# Patient Record
Sex: FEMALE | Race: WHITE | Employment: FULL TIME | URBAN - METROPOLITAN AREA
[De-identification: names, ages, dates, MRNs, and addresses within clinical notes are randomized per-mention and may not be internally consistent; named-entity substitution may affect disease eponyms.]

---

## 2017-04-07 ENCOUNTER — DOCUMENTATION ONLY (OUTPATIENT)
Dept: SURGERY | Age: 57
End: 2017-04-07

## 2017-04-07 ENCOUNTER — OFFICE VISIT (OUTPATIENT)
Dept: SURGERY | Age: 57
End: 2017-04-07

## 2017-04-07 VITALS
HEIGHT: 67 IN | WEIGHT: 247 LBS | BODY MASS INDEX: 38.77 KG/M2 | DIASTOLIC BLOOD PRESSURE: 65 MMHG | HEART RATE: 74 BPM | SYSTOLIC BLOOD PRESSURE: 112 MMHG

## 2017-04-07 DIAGNOSIS — N64.89 SEROMA OF BREAST: Primary | ICD-10-CM

## 2017-04-07 DIAGNOSIS — Z98.890 S/P LUMPECTOMY, LEFT BREAST: ICD-10-CM

## 2017-04-07 DIAGNOSIS — C50.412 BREAST CANCER OF UPPER-OUTER QUADRANT OF LEFT FEMALE BREAST (HCC): ICD-10-CM

## 2017-04-07 RX ORDER — CLINDAMYCIN HYDROCHLORIDE 300 MG/1
CAPSULE ORAL
COMMUNITY
Start: 2017-04-04 | End: 2017-04-27 | Stop reason: ALTCHOICE

## 2017-04-07 RX ORDER — VENLAFAXINE HYDROCHLORIDE 150 MG/1
75 CAPSULE, EXTENDED RELEASE ORAL
COMMUNITY
Start: 2017-02-02 | End: 2017-10-23 | Stop reason: SDUPTHER

## 2017-04-07 NOTE — PATIENT INSTRUCTIONS
Breast Self-Exam: Care Instructions  Your Care Instructions  A breast self-exam is when you check your breasts for lumps or changes. This regular exam helps you learn how your breasts normally look and feel. Most breast problems or changes are not because of cancer. Breast self-exam is not a substitute for a mammogram. Having regular breast exams by your doctor and regular mammograms improve your chances of finding any problems with your breasts. Some women set a time each month to do a step-by-step breast self-exam. Other women like a less formal system. They might look at their breasts as they brush their teeth, or feel their breasts once in a while in the shower. If you notice a change in your breast, tell your doctor. Follow-up care is a key part of your treatment and safety. Be sure to make and go to all appointments, and call your doctor if you are having problems. Its also a good idea to know your test results and keep a list of the medicines you take. How do you do a breast self-exam?  · The best time to examine your breasts is usually one week after your menstrual period begins. Your breasts should not be tender then. If you do not have periods, you might do your exam on a day of the month that is easy to remember. · To examine your breasts:  ¨ Remove all your clothes above the waist and lie down. When you are lying down, your breast tissue spreads evenly over your chest wall, which makes it easier to feel all your breast tissue. ¨ Use the pads--not the fingertips--of the 3 middle fingers of your left hand to check your right breast. Move your fingers slowly in small coin-sized circles that overlap. ¨ Use three levels of pressure to feel of all your breast tissue. Use light pressure to feel the tissue close to the skin surface. Use medium pressure to feel a little deeper. Use firm pressure to feel your tissue close to your breastbone and ribs.  Use each pressure level to feel your breast tissue before moving on to the next spot. ¨ Check your entire breast, moving up and down as if following a strip from the collarbone to the bra line, and from the armpit to the ribs. Repeat until you have covered the entire breast.  ¨ Repeat this procedure for your left breast, using the pads of the 3 middle fingers of your right hand. · To examine your breasts while in the shower:  ¨ Place one arm over your head and lightly soap your breast on that side. ¨ Using the pads of your fingers, gently move your hand over your breast (in the strip pattern described above), feeling carefully for any lumps or changes. ¨ Repeat for the other breast.  · Have your doctor inspect anything you notice to see if you need further testing. Where can you learn more? Go to http://camila-yony.info/. Enter P148 in the search box to learn more about \"Breast Self-Exam: Care Instructions. \"  Current as of: July 26, 2016  Content Version: 11.2  © 6360-8942 [facility]. Care instructions adapted under license by [employer] (which disclaims liability or warranty for this information). If you have questions about a medical condition or this instruction, always ask your healthcare professional. [place] [address] any warranty or liability for your use of this information.

## 2017-04-10 ENCOUNTER — OFFICE VISIT (OUTPATIENT)
Dept: SURGERY | Age: 57
End: 2017-04-10

## 2017-04-10 VITALS
WEIGHT: 247 LBS | HEART RATE: 87 BPM | SYSTOLIC BLOOD PRESSURE: 121 MMHG | DIASTOLIC BLOOD PRESSURE: 56 MMHG | BODY MASS INDEX: 38.77 KG/M2 | HEIGHT: 67 IN

## 2017-04-10 DIAGNOSIS — C50.412 BREAST CANCER OF UPPER-OUTER QUADRANT OF LEFT FEMALE BREAST (HCC): Primary | ICD-10-CM

## 2017-04-10 NOTE — PATIENT INSTRUCTIONS
Seroma: Care Instructions  Your Care Instructions  After a surgery, fluid can collect under the skin near the cut the doctor made (incision). This soft, puffy area is called a seroma. It can be tender to touch. The incision may even have opened up. Some seromas get better on their own. But when there is a lot of fluid under the skin, a seroma is drained to help the area heal.  If your incision has opened up, it may either be packed with gauze or left open to heal. To prevent infection, make sure to keep the area clean and to take all medicines as prescribed. Follow-up care is a key part of your treatment and safety. Be sure to make and go to all appointments, and call your doctor if you are having problems. It's also a good idea to know your test results and keep a list of the medicines you take. How can you care for yourself at home? · Follow your doctor's instructions for seroma care. If you have a drain tube, your doctor will tell you how to take care of it. · Look at the incision every day. Keep the area clean and dry. · Do not bathe unless you can keep the incision dry. Start with sponge baths. Ask your doctor when it is safe to shower. · Do not scrub or rub the incision. And don't wear clothing that rubs it. · Leave any tape strips (such as Steri-Strips) on your incision. They will fall off on their own, or your doctor may tell you when to take them off. · Do not put lotion or powder on incisions. · Keep your incision out of direct sunlight. · Be safe with medicines. Read and follow all instructions on the label. ¨ If the doctor gave you a prescription medicine for pain, take it as prescribed. ¨ If you are not taking a prescription pain medicine, ask the doctor if you can take an over-the-counter medicine. · Your doctor may give you specific instructions on when you can do your normal activities again, such as driving and going back to work. When should you call for help?   Call 911 anytime you think you may need emergency care. For example, call if:  · You passed out (lost consciousness). · You have severe trouble breathing. Call your doctor now or seek immediate medical care if:  · You have symptoms of infection, such as:  ¨ Increased pain, swelling, warmth, or redness. ¨ Red streaks leading from the incision. ¨ Pus draining from the incision or a yellow or green discharge that is increasing. ¨ A fever. · You bleed through a bandage. · The incision opens up. Watch closely for changes in your health, and be sure to contact your doctor if:  · The incision is not healing as expected. Where can you learn more? Go to http://camila-yony.info/. Enter S817 in the search box to learn more about \"Seroma: Care Instructions. \"  Current as of: May 27, 2016  Content Version: 11.2  © 1496-4581 [employer]. Care instructions adapted under license by [employer] (which disclaims liability or warranty for this information). If you have questions about a medical condition or this instruction, always ask your healthcare professional. [address] any warranty or liability for your use of this information.

## 2017-04-10 NOTE — PROGRESS NOTES
HISTORY OF PRESENT ILLNESS  Fidencio Caro is a 60 y.o. female. HPI   ESTABLISHED patient here for follow-up of infected seroma. Was seen on Friday by Dr. Bianka Singleton, who aspirated 6cc's of purulent fluid. This was sent for culture, and there is no growth so far. Patient continues on her Clindamycin. Pain in the breast is somewhat better. Is not sure about the redness. Did have a little drainage on a dressing post-aspiration and also on her bed sheets the following day. Has had none since then. Has had no fever. Has not taken anything for pain over the last couple of days. Did take Aleve early on last week. History of breast cancer 2015-  LEFT breast IDC - ER positive, [place] positive, Her2 negative, LN negative. 1/5/16- LEFT breast lumpectomy, SLNB - T2N0  Mammaprint low risk luminal A. No chemotherapy. 4/5/16- completed radiation. Followed by Dr. Paige Brody. 5/3/16- started Arimidex. Followed by Dr. Bee Gonzalez.     [facility]    Physical Exam    ASSESSMENT and PLAN  {ASSESSMENT/PLAN:17752}

## 2017-04-10 NOTE — PROGRESS NOTES
HISTORY OF PRESENT ILLNESS  Micki Hunt is a 60 y.o. female. HPI  ESTABLISHED patient here for follow-up of infected seroma. Was seen on Friday by Dr. Jakob Dimas, who aspirated 6cc's of purulent fluid. This was sent for culture, and there is no growth so far. Patient continues on her Clindamycin. Pain in the breast is somewhat better. Is not sure about the redness. Did have a little drainage on a dressing post-aspiration and also on her bed sheets the following day. Has had none since then. Has had no fever. Has not taken anything for pain over the last couple of days. Did take Aleve early on last week.      History of breast cancer 2015-  LEFT breast IDC - ER positive, [place] positive, Her2 negative, LN negative. 1/5/16- LEFT breast lumpectomy, SLNB - T2N0  Mammaprint low risk luminal A. No chemotherapy. 4/5/16- completed radiation. Followed by Dr. Kanchan Tolbert. 5/3/16- started Arimidex. Followed by Dr. Lorette Bloch. Past Medical History:   Diagnosis Date    Breast cancer ([facility] [address] 12/05/2015    IDC, DCIS    Cancer ([facility] [address]     LEFT BREAST    Fibrocystic breast disease     Psychiatric disorder 12/29/15    DEPRESSION    PUD (peptic ulcer disease)     OCCASIONAL DISCOMFORT-OCC TAKES ZANTAC    Radiation therapy complication 1711    Sleep apnea     USES CPAP       Past Surgical History:   Procedure Laterality Date    BREAST SURGERY PROCEDURE UNLISTED  1981    BREAST BIOPSY-CYST-LEFT    HX APPENDECTOMY  1973    HX BREAST LUMPECTOMY Left 1/5/2016    LEFT BREAST LUMPECTOMY WITH SENTINEL NODE BIOPSY WITH ULTRASOUND performed by [address], MD at [address] HX COLONOSCOPY  X2    LATEST-2015    HX WISDOM TEETH EXTRACTION  1983       Social History     Social History    Marital status:      Spouse name: N/A    Number of children: N/A    Years of education: N/A     Occupational History    Not on file.      Social History Main Topics    Smoking status: Former Smoker     Packs/day: 0.25     Years: 30.00     Quit date: 12/29/2014    Smokeless tobacco: Never Used      Comment: HAS QUIT NUMEROUS TIMES    Alcohol use 2.4 oz/week     2 Glasses of wine, 2 Standard drinks or equivalent per week    Drug use: No      Comment: BEFORE AGE 16, USED HASH      Sexual activity: Not on file     Other Topics Concern    Not on file     Social History Narrative       Current Outpatient Prescriptions on File Prior to Visit   Medication Sig Dispense Refill    clindamycin (CLEOCIN) 300 mg capsule       venlafaxine-SR (EFFEXOR-XR) 150 mg capsule       DOCUSATE CALCIUM (STOOL SOFTENER PO) Take  by mouth.  cyanocobalamin (VITAMIN B-12) 500 mcg tablet Take 500 mcg by mouth daily.  anastrozole (ARIMIDEX) 1 mg tablet Take 1 Tab by mouth daily. Indications: HORMONE RECEPTOR POSITIVE BREAST CANCER 90 Tab 3    aspirin delayed-release 81 mg tablet Take  by mouth daily.  cholecalciferol (VITAMIN D3) 1,000 unit tablet Take 2,000 Units by mouth daily.  CALCIUM CARBONATE (CALCIUM 600 PO) Take 1,200 mg by mouth daily.  multivitamin (ONE A DAY) tablet Take 1 Tab by mouth daily.  VITAMIN E ACETATE (VITAMIN E PO) Take 1,000 Units by mouth daily.  ASCORBATE CALCIUM (VITAMIN C PO) Take 1,000 mg by mouth daily. No current facility-administered medications on file prior to visit. Allergies   Allergen Reactions    Pcn [Penicillins] Other (comments)     \"convulsions\"    Sulfa (Sulfonamide Antibiotics) Hives       OB History     Obstetric Comments    Menarche:  13. LMP: 50.  # of Children:  0. Age at Delivery of First Child:  n/a. Hysterectomy/oophorectomy:  NO/NO. Breast Bx:  yes. Hx of Breast Feeding:  no. BCP:  yes. Hormone therapy:  ? .                 ROS  Constitutional: Negative    HENT: Negative. Eyes: Negative. Respiratory: Negative. Cardiovascular: Negative. Gastrointestinal: Negative. Genitourinary: Negative. Musculoskeletal: Negative. Skin: Negative. Neurological: Negative. Endo/Heme/Allergies: Negative. Psychiatric/Behavioral: Negative. Physical Exam   Cardiovascular: Normal rate and normal heart sounds. Pulmonary/Chest: Breath sounds normal. Right breast exhibits no inverted nipple, no mass, no nipple discharge, no skin change and no tenderness. Left breast exhibits tenderness (around incision site). Left breast exhibits no inverted nipple, no mass, no nipple discharge and no skin change. Breasts are symmetrical.       Lymphadenopathy:        Right cervical: No superficial cervical, no deep cervical and no posterior cervical adenopathy present. Left cervical: No superficial cervical, no deep cervical and no posterior cervical adenopathy present. Right axillary: No pectoral and no lateral adenopathy present. Left axillary: No pectoral and no lateral adenopathy present. BREAST ULTRASOUND  Indication: Left  breast mass 3:00  Technique: The area was scanned using a high-frequency linear-array near-field transducer  Findings: minimal fluid cavity  Impression: Mild residual seroma   Disposition: Observation and antibiotics    ASSESSMENT and PLAN    ICD-10-CM ICD-9-CM    1. Breast cancer of upper-outer quadrant of left female breast ([facility] [address] C50.412 174.4      Dr. Stone Fat pt here to f/u on infected seroma cavity in LT breast and doing well. Advised to observe and continue to treat with clindamycin as she continues to improve with abx after aspiration last week. F/u with Dr. Freddi Galeazzi in 1 week or sooner prn. This plan was reviewed with the patient and patient agrees. All questions were answered.     Written by Hayden Ceja, as dictated by Dr. Jennie Hendrickson MD.

## 2017-04-11 LAB — BACTERIA SPEC ANAEROBE CULT: NORMAL

## 2017-04-11 NOTE — COMMUNICATION BODY
HISTORY OF PRESENT ILLNESS  Micki Hunt is a 60 y.o. female. HPI  ESTABLISHED patient here for follow-up of infected seroma. Was seen on Friday by Dr. Jakob Dimas, who aspirated 6cc's of purulent fluid. This was sent for culture, and there is no growth so far. Patient continues on her Clindamycin. Pain in the breast is somewhat better. Is not sure about the redness. Did have a little drainage on a dressing post-aspiration and also on her bed sheets the following day. Has had none since then. Has had no fever. Has not taken anything for pain over the last couple of days. Did take Aleve early on last week.      History of breast cancer 2015-  LEFT breast IDC - ER positive, [place] positive, Her2 negative, LN negative. 1/5/16- LEFT breast lumpectomy, SLNB - T2N0  Mammaprint low risk luminal A. No chemotherapy. 4/5/16- completed radiation. Followed by Dr. Kanchan Tolbert. 5/3/16- started Arimidex. Followed by Dr. Lorette Bloch. Past Medical History:   Diagnosis Date    Breast cancer ([facility] [address] 12/05/2015    IDC, DCIS    Cancer ([facility] [address]     LEFT BREAST    Fibrocystic breast disease     Psychiatric disorder 12/29/15    DEPRESSION    PUD (peptic ulcer disease)     OCCASIONAL DISCOMFORT-OCC TAKES ZANTAC    Radiation therapy complication 0575    Sleep apnea     USES CPAP       Past Surgical History:   Procedure Laterality Date    BREAST SURGERY PROCEDURE UNLISTED  1981    BREAST BIOPSY-CYST-LEFT    HX APPENDECTOMY  1973    HX BREAST LUMPECTOMY Left 1/5/2016    LEFT BREAST LUMPECTOMY WITH SENTINEL NODE BIOPSY WITH ULTRASOUND performed by Chicho Seo MD at [address] HX COLONOSCOPY  X2    LATEST-2015    HX WISDOM TEETH EXTRACTION  1983       Social History     Social History    Marital status:      Spouse name: N/A    Number of children: N/A    Years of education: N/A     Occupational History    Not on file.      Social History Main Topics    Smoking status: Former Smoker     Packs/day: 0.25     Years: 30.00     Quit date: 12/29/2014    Smokeless tobacco: Never Used      Comment: HAS QUIT NUMEROUS TIMES    Alcohol use 2.4 oz/week     2 Glasses of wine, 2 Standard drinks or equivalent per week    Drug use: No      Comment: BEFORE AGE 16, USED HASH      Sexual activity: Not on file     Other Topics Concern    Not on file     Social History Narrative       Current Outpatient Prescriptions on File Prior to Visit   Medication Sig Dispense Refill    clindamycin (CLEOCIN) 300 mg capsule       venlafaxine-SR (EFFEXOR-XR) 150 mg capsule       DOCUSATE CALCIUM (STOOL SOFTENER PO) Take  by mouth.  cyanocobalamin (VITAMIN B-12) 500 mcg tablet Take 500 mcg by mouth daily.  anastrozole (ARIMIDEX) 1 mg tablet Take 1 Tab by mouth daily. Indications: HORMONE RECEPTOR POSITIVE BREAST CANCER 90 Tab 3    aspirin delayed-release 81 mg tablet Take  by mouth daily.  cholecalciferol (VITAMIN D3) 1,000 unit tablet Take 2,000 Units by mouth daily.  CALCIUM CARBONATE (CALCIUM 600 PO) Take 1,200 mg by mouth daily.  multivitamin (ONE A DAY) tablet Take 1 Tab by mouth daily.  VITAMIN E ACETATE (VITAMIN E PO) Take 1,000 Units by mouth daily.  ASCORBATE CALCIUM (VITAMIN C PO) Take 1,000 mg by mouth daily. No current facility-administered medications on file prior to visit. Allergies   Allergen Reactions    Pcn [Penicillins] Other (comments)     \"convulsions\"    Sulfa (Sulfonamide Antibiotics) Hives       OB History     Obstetric Comments    Menarche:  13. LMP: 50.  # of Children:  0. Age at Delivery of First Child:  n/a. Hysterectomy/oophorectomy:  NO/NO. Breast Bx:  yes. Hx of Breast Feeding:  no. BCP:  yes. Hormone therapy:  ? .                 ROS  Constitutional: Negative    HENT: Negative. Eyes: Negative. Respiratory: Negative. Cardiovascular: Negative. Gastrointestinal: Negative. Genitourinary: Negative. Musculoskeletal: Negative. Skin: Negative. Neurological: Negative. Endo/Heme/Allergies: Negative. Psychiatric/Behavioral: Negative. Physical Exam   Cardiovascular: Normal rate and normal heart sounds. Pulmonary/Chest: Breath sounds normal. Right breast exhibits no inverted nipple, no mass, no nipple discharge, no skin change and no tenderness. Left breast exhibits tenderness (around incision site). Left breast exhibits no inverted nipple, no mass, no nipple discharge and no skin change. Breasts are symmetrical.       Lymphadenopathy:        Right cervical: No superficial cervical, no deep cervical and no posterior cervical adenopathy present. Left cervical: No superficial cervical, no deep cervical and no posterior cervical adenopathy present. Right axillary: No pectoral and no lateral adenopathy present. Left axillary: No pectoral and no lateral adenopathy present. BREAST ULTRASOUND  Indication: Left  breast mass 3:00  Technique: The area was scanned using a high-frequency linear-array near-field transducer  Findings: minimal fluid cavity  Impression: Mild residual seroma   Disposition: Observation and antibiotics    ASSESSMENT and PLAN    ICD-10-CM ICD-9-CM    1. [facility] upper-outer quadrant [facility] C50.412 174.4      Dr. Martine Pathak pt here to f/u on infected seroma cavity in LT breast and doing well. Advised to observe and continue to treat with clindamycin as she continues to improve with abx after aspiration last week. F/u with Dr. Nellie Govea in 1 week or sooner prn. This plan was reviewed with the patient and patient agrees. All questions were answered.     Written by Seth Mcelod, as dictated by Dr. Nancy Hernandez MD.

## 2017-04-17 ENCOUNTER — OFFICE VISIT (OUTPATIENT)
Dept: SURGERY | Age: 57
End: 2017-04-17

## 2017-04-17 VITALS
DIASTOLIC BLOOD PRESSURE: 52 MMHG | HEART RATE: 87 BPM | SYSTOLIC BLOOD PRESSURE: 109 MMHG | HEIGHT: 67 IN | WEIGHT: 247 LBS | BODY MASS INDEX: 38.77 KG/M2

## 2017-04-17 DIAGNOSIS — C50.412 BREAST CANCER OF UPPER-OUTER QUADRANT OF LEFT FEMALE BREAST (HCC): Primary | ICD-10-CM

## 2017-04-17 DIAGNOSIS — Z98.890 S/P LUMPECTOMY, LEFT BREAST: ICD-10-CM

## 2017-04-17 NOTE — PATIENT INSTRUCTIONS
Breast Cancer: Care Instructions  Your Care Instructions  Breast cancer occurs when abnormal cells grow out of control in the breast. These cancer cells can spread within the breast, to nearby lymph nodes and other tissues, and to other parts of the body. Being treated for cancer can weaken your body, and you may feel very tired. Get the rest your body needs so you can feel better. Finding out that you have cancer is scary. You may feel many emotions and may need some help coping. Seek out family, friends, and counselors for support. You also can do things at home to make yourself feel better while you go through treatment. Call the [employer] (4-606.962.8626) or visit its website at [employer] for more information. Follow-up care is a key part of your treatment and safety. Be sure to make and go to all appointments, and call your doctor if you are having problems. It's also a good idea to know your test results and keep a list of the medicines you take. How can you care for yourself at home? · Take your medicines exactly as prescribed. Call your doctor if you think you are having a problem with your medicine. You may get medicine for nausea and vomiting if you have these side effects. · Follow your doctor's instructions to relieve pain. Pain from cancer and surgery can almost always be controlled. Use pain medicine when you first notice pain, before it becomes severe. · Eat healthy food. If you do not feel like eating, try to eat food that has protein and extra calories to keep up your strength and prevent weight loss. Drink liquid meal replacements for extra calories and protein. Try to eat your main meal early. · Get some physical activity every day, but do not get too tired. Keep doing the hobbies you enjoy as your energy allows. · Do not smoke. Smoking can make your cancer worse. If you need help quitting, talk to your doctor about stop-smoking programs and medicines.  These can increase your chances of quitting for good. · Take steps to control your stress and workload. Learn relaxation techniques. ¨ Share your feelings. Stress and tension affect our emotions. By expressing your feelings to others, you may be able to understand and cope with them. ¨ Consider joining a support group. Talking about a problem with your spouse, a good friend, or other people with similar problems is a good way to reduce tension and stress. ¨ Express yourself through art. Try writing, crafts, dance, or art to relieve stress. Some dance, writing, or art groups may be available just for people who have cancer. ¨ Be kind to your body and mind. Getting enough sleep, eating a healthy diet, and taking time to do things you enjoy can contribute to an overall feeling of balance in your life and can help reduce stress. ¨ Get help if you need it. Discuss your concerns with your doctor or counselor. · If you are vomiting or have diarrhea:  ¨ Drink plenty of fluids (enough so that your urine is light yellow or clear like water) to prevent dehydration. Choose water and other caffeine-free clear liquids. If you have kidney, heart, or liver disease and have to limit fluids, talk with your doctor before you increase the amount of fluids you drink. ¨ When you are able to eat, try clear soups, mild foods, and liquids until all symptoms are gone for 12 to 48 hours. Other good choices include dry toast, crackers, cooked cereal, and gelatin dessert, such as Jell-O.  · If you have not already done so, prepare a list of advance directives. Advance directives are instructions to your doctor and family members about what kind of care you want if you become unable to speak or express yourself. When should you call for help? Call your doctor now or seek immediate medical care if:  · You have a fever. · Any part of your breast becomes red, tender, swollen, or hot.   · You have pain, redness, or swelling in the arm on the same side as your breast cancer. Watch closely for changes in your health, and be sure to contact your doctor if:  · You have pain that is not controlled by medicine. · You have nausea or vomiting. · You are constipated or have diarrhea. Where can you learn more? Go to http://camila-yony.info/. Enter V321 in the search box to learn more about \"Breast Cancer: Care Instructions. \"  Current as of: July 26, 2016  Content Version: 11.2  © 7032-4804 [employer]. Care instructions adapted under license by [employer] (which disclaims liability or warranty for this information). If you have questions about a medical condition or this instruction, always ask your healthcare professional. [address] any warranty or liability for your use of this information. Breast Cancer: Care Instructions  Your Care Instructions  Breast cancer occurs when abnormal cells grow out of control in the breast. These cancer cells can spread within the breast, to nearby lymph nodes and other tissues, and to other parts of the body. Being treated for cancer can weaken your body, and you may feel very tired. Get the rest your body needs so you can feel better. Finding out that you have cancer is scary. You may feel many emotions and may need some help coping. Seek out family, friends, and counselors for support. You also can do things at home to make yourself feel better while you go through treatment. Call the [facility] (9-110.431.6203) or visit its website at [address] [employer]. [employer] for more information. Follow-up care is a key part of your treatment and safety. Be sure to make and go to all appointments, and call your doctor if you are having problems. It's also a good idea to know your test results and keep a list of the medicines you take. How can you care for yourself at home? · Take your medicines exactly as prescribed.  Call your doctor if you think you are having a problem with your medicine. You may get medicine for nausea and vomiting if you have these side effects. · Follow your doctor's instructions to relieve pain. Pain from cancer and surgery can almost always be controlled. Use pain medicine when you first notice pain, before it becomes severe. · Eat healthy food. If you do not feel like eating, try to eat food that has protein and extra calories to keep up your strength and prevent weight loss. Drink liquid meal replacements for extra calories and protein. Try to eat your main meal early. · Get some physical activity every day, but do not get too tired. Keep doing the hobbies you enjoy as your energy allows. · Do not smoke. Smoking can make your cancer worse. If you need help quitting, talk to your doctor about stop-smoking programs and medicines. These can increase your chances of quitting for good. · Take steps to control your stress and workload. Learn relaxation techniques. ¨ Share your feelings. Stress and tension affect our emotions. By expressing your feelings to others, you may be able to understand and cope with them. ¨ Consider joining a support group. Talking about a problem with your spouse, a good friend, or other people with similar problems is a good way to reduce tension and stress. ¨ Express yourself through art. Try writing, crafts, dance, or art to relieve stress. Some dance, writing, or art groups may be available just for people who have cancer. ¨ Be kind to your body and mind. Getting enough sleep, eating a healthy diet, and taking time to do things you enjoy can contribute to an overall feeling of balance in your life and can help reduce stress. ¨ Get help if you need it. Discuss your concerns with your doctor or counselor. · If you are vomiting or have diarrhea:  ¨ Drink plenty of fluids (enough so that your urine is light yellow or clear like water) to prevent dehydration. Choose water and other caffeine-free clear liquids. If you have kidney, heart, or liver disease and have to limit fluids, talk with your doctor before you increase the amount of fluids you drink. ¨ When you are able to eat, try clear soups, mild foods, and liquids until all symptoms are gone for 12 to 48 hours. Other good choices include dry toast, crackers, cooked cereal, and gelatin dessert, such as Jell-O.  · If you have not already done so, prepare a list of advance directives. Advance directives are instructions to your doctor and family members about what kind of care you want if you become unable to speak or express yourself. When should you call for help? Call your doctor now or seek immediate medical care if:  · You have a fever. · Any part of your breast becomes red, tender, swollen, or hot. · You have pain, redness, or swelling in the arm on the same side as your breast cancer. Watch closely for changes in your health, and be sure to contact your doctor if:  · You have pain that is not controlled by medicine. · You have nausea or vomiting. · You are constipated or have diarrhea. Where can you learn more? Go to http://camila-yony.info/. Enter V321 in the search box to learn more about \"Breast Cancer: Care Instructions. \"  Current as of: July 26, 2016  Content Version: 11.2  © 4581-3680 [employer]. Care instructions adapted under license by S[employer] (which disclaims liability or warranty for this information). If you have questions about a medical condition or this instruction, always ask your healthcare professional. [place] [address] any warranty or liability for your use of this information.

## 2017-04-17 NOTE — MR AVS SNAPSHOT
Visit Information Date & Time Provider Department Dept. Phone Encounter #  
 4/17/2017  9:30  Michaela Russell MD [address] [facility] 51 [phone] Upcoming Health Maintenance Date Due Hepatitis C Screening 1960 Pneumococcal 19-64 Highest Risk (1 of 3 - PCV13) 9/12/1977 DTaP/Tdap/Td series (1 - Tdap) 9/12/1979 PAP AKA CERVICAL CYTOLOGY 9/12/1979 FOBT Q 1 YEAR AGE 50-75 9/12/2008 INFLUENZA AGE 9 TO ADULT 8/1/2016 BREAST CANCER SCRN MAMMOGRAM 10/3/2018 Allergies as of 4/17/2017  Review Complete On: 4/17/2017 By: Jovon Torres RN Severity Noted Reaction Type Reactions Pcn [Penicillins] High 12/14/2015    Other (comments) \"convulsions\" Sulfa (Sulfonamide Antibiotics)  12/14/2015    Hives Current Immunizations  Reviewed on 9/13/2016 No immunizations on file. Not reviewed this visit Vitals BP Pulse Height(growth percentile) Weight(growth percentile) BMI OB Status 109/52 87 5' 7\" (1.702 m) 247 lb (112 kg) 38.69 kg/m2 Postmenopausal  
 Smoking Status Former Smoker BMI and BSA Data Body Mass Index Body Surface Area  
 38.69 kg/m 2 2.3 m 2 Preferred Pharmacy Pharmacy Name Phone [facility] [address] [place] [address] [phone] Your Updated Medication List  
  
   
This list is accurate as of: 4/17/17 10:09 AM.  Always use your most recent med list.  
  
  
  
  
 anastrozole 1 mg tablet Commonly known as:  ARIMIDEX Take 1 Tab by mouth daily. Indications: HORMONE RECEPTOR POSITIVE BREAST CANCER  
  
 aspirin delayed-release 81 mg tablet Take  by mouth daily. CALCIUM 600 PO Take 1,200 mg by mouth daily. cholecalciferol 1,000 unit tablet Commonly known as:  VITAMIN D3 Take 2,000 Units by mouth daily. clindamycin 300 mg capsule Commonly known as:  CLEOCIN  
  
 multivitamin tablet Commonly known as:  ONE A DAY Take 1 Tab by mouth daily. STOOL SOFTENER PO Take  by mouth. venlafaxine- mg capsule Commonly known as:  EFFEXOR-XR  
  
 VITAMIN B-12 500 mcg tablet Generic drug:  cyanocobalamin Take 500 mcg by mouth daily. VITAMIN C PO Take 1,000 mg by mouth daily. VITAMIN E PO Take 1,000 Units by mouth daily. Patient Instructions Breast Cancer: Care Instructions Your Care Instructions Breast cancer occurs when abnormal cells grow out of control in the breast. These cancer cells can spread within the breast, to nearby lymph nodes and other tissues, and to other parts of the body. Being treated for cancer can weaken your body, and you may feel very tired. Get the rest your body needs so you can feel better. Finding out that you have cancer is scary. You may feel many emotions and may need some help coping. Seek out family, friends, and counselors for support. You also can do things at home to make yourself feel better while you go through treatment. Call the [employer] (0-577.668.9135) or visit its website at [address] [employer] for more information. Follow-up care is a key part of your treatment and safety. Be sure to make and go to all appointments, and call your doctor if you are having problems. It's also a good idea to know your test results and keep a list of the medicines you take. How can you care for yourself at home? · Take your medicines exactly as prescribed. Call your doctor if you think you are having a problem with your medicine. You may get medicine for nausea and vomiting if you have these side effects. · Follow your doctor's instructions to relieve pain. Pain from cancer and surgery can almost always be controlled. Use pain medicine when you first notice pain, before it becomes severe. · Eat healthy food.  If you do not feel like eating, try to eat food that has protein and extra calories to keep up your strength and prevent weight loss. Drink liquid meal replacements for extra calories and protein. Try to eat your main meal early. · Get some physical activity every day, but do not get too tired. Keep doing the hobbies you enjoy as your energy allows. · Do not smoke. Smoking can make your cancer worse. If you need help quitting, talk to your doctor about stop-smoking programs and medicines. These can increase your chances of quitting for good. · Take steps to control your stress and workload. Learn relaxation techniques. ¨ Share your feelings. Stress and tension affect our emotions. By expressing your feelings to others, you may be able to understand and cope with them. ¨ Consider joining a support group. Talking about a problem with your spouse, a good friend, or other people with similar problems is a good way to reduce tension and stress. ¨ Express yourself through art. Try writing, crafts, dance, or art to relieve stress. Some dance, writing, or art groups may be available just for people who have cancer. ¨ Be kind to your body and mind. Getting enough sleep, eating a healthy diet, and taking time to do things you enjoy can contribute to an overall feeling of balance in your life and can help reduce stress. ¨ Get help if you need it. Discuss your concerns with your doctor or counselor. · If you are vomiting or have diarrhea: ¨ Drink plenty of fluids (enough so that your urine is light yellow or clear like water) to prevent dehydration. Choose water and other caffeine-free clear liquids. If you have kidney, heart, or liver disease and have to limit fluids, talk with your doctor before you increase the amount of fluids you drink. ¨ When you are able to eat, try clear soups, mild foods, and liquids until all symptoms are gone for 12 to 48 hours. Other good choices include dry toast, crackers, cooked cereal, and gelatin dessert, such as Jell-O. · If you have not already done so, prepare a list of advance directives. Advance directives are instructions to your doctor and family members about what kind of care you want if you become unable to speak or express yourself. When should you call for help? Call your doctor now or seek immediate medical care if: 
· You have a fever. · Any part of your breast becomes red, tender, swollen, or hot. · You have pain, redness, or swelling in the arm on the same side as your breast cancer. Watch closely for changes in your health, and be sure to contact your doctor if: 
· You have pain that is not controlled by medicine. · You have nausea or vomiting. · You are constipated or have diarrhea. Where can you learn more? Go to http://camila-yony.info/. Enter V321 in the search box to learn more about \"Breast Cancer: Care Instructions. \" Current as of: July 26, 2016 Content Version: 11.2 © 9042-0009 [employer]. Care instructions adapted under license by [employer]tdWorld (which disclaims liability or warranty for this information). If you have questions about a medical condition or this instruction, always ask your healthcare professional. [address] any warranty or liability for your use of this information. Breast Cancer: Care Instructions Your Care Instructions Breast cancer occurs when abnormal cells grow out of control in the breast. These cancer cells can spread within the breast, to nearby lymph nodes and other tissues, and to other parts of the body. Being treated for cancer can weaken your body, and you may feel very tired. Get the rest your body needs so you can feel better. Finding out that you have cancer is scary. You may feel many emotions and may need some help coping. Seek out family, friends, and counselors for support.  You also can do things at home to make yourself feel better while you go through treatment. Call the [facility] (0-160.221.3093) or visit its website at [address]. [employer] for more information. Follow-up care is a key part of your treatment and safety. Be sure to make and go to all appointments, and call your doctor if you are having problems. It's also a good idea to know your test results and keep a list of the medicines you take. How can you care for yourself at home? · Take your medicines exactly as prescribed. Call your doctor if you think you are having a problem with your medicine. You may get medicine for nausea and vomiting if you have these side effects. · Follow your doctor's instructions to relieve pain. Pain from cancer and surgery can almost always be controlled. Use pain medicine when you first notice pain, before it becomes severe. · Eat healthy food. If you do not feel like eating, try to eat food that has protein and extra calories to keep up your strength and prevent weight loss. Drink liquid meal replacements for extra calories and protein. Try to eat your main meal early. · Get some physical activity every day, but do not get too tired. Keep doing the hobbies you enjoy as your energy allows. · Do not smoke. Smoking can make your cancer worse. If you need help quitting, talk to your doctor about stop-smoking programs and medicines. These can increase your chances of quitting for good. · Take steps to control your stress and workload. Learn relaxation techniques. ¨ Share your feelings. Stress and tension affect our emotions. By expressing your feelings to others, you may be able to understand and cope with them. ¨ Consider joining a support group. Talking about a problem with your spouse, a good friend, or other people with similar problems is a good way to reduce tension and stress. ¨ Express yourself through art. Try writing, crafts, dance, or art to relieve stress.  Some dance, writing, or art groups may be available just for people who have cancer. ¨ Be kind to your body and mind. Getting enough sleep, eating a healthy diet, and taking time to do things you enjoy can contribute to an overall feeling of balance in your life and can help reduce stress. ¨ Get help if you need it. Discuss your concerns with your doctor or counselor. · If you are vomiting or have diarrhea: ¨ Drink plenty of fluids (enough so that your urine is light yellow or clear like water) to prevent dehydration. Choose water and other caffeine-free clear liquids. If you have kidney, heart, or liver disease and have to limit fluids, talk with your doctor before you increase the amount of fluids you drink. ¨ When you are able to eat, try clear soups, mild foods, and liquids until all symptoms are gone for 12 to 48 hours. Other good choices include dry toast, crackers, cooked cereal, and gelatin dessert, such as Jell-O. · If you have not already done so, prepare a list of advance directives. Advance directives are instructions to your doctor and family members about what kind of care you want if you become unable to speak or express yourself. When should you call for help? Call your doctor now or seek immediate medical care if: 
· You have a fever. · Any part of your breast becomes red, tender, swollen, or hot. · You have pain, redness, or swelling in the arm on the same side as your breast cancer. Watch closely for changes in your health, and be sure to contact your doctor if: 
· You have pain that is not controlled by medicine. · You have nausea or vomiting. · You are constipated or have diarrhea. Where can you learn more? Go to http://camila-yony.info/. Enter V321 in the search box to learn more about \"Breast Cancer: Care Instructions. \" Current as of: July 26, 2016 Content Version: 11.2 © 3858-0400 [employer].  Care instructions adapted under license by Subtext (which disclaims liability or warranty for this information). If you have questions about a medical condition or this instruction, always ask your healthcare professional. [address] any warranty or liability for your use of this information. Introducing [facility] [place] [facility]! Dear Koby Davis: Thank you for requesting a [email or web address] account. Our records indicate that you have previously registered for a [email or web address] account but its currently inactive. Please call our Footfall123 support line at [phone]. Additional Information If you have questions, please visit the Frequently Asked Questions section of the [email or web address] website at [email or web address]. "[email or web address]/[email or web address]/. Remember, Footfall123 is NOT to be used for urgent needs. For medical emergencies, dial 911. Now available from your iPhone and Android! Please provide this summary of care documentation to your next provider. Your primary care clinician is listed as [address]. If you have any questions after today's visit, please call [phone].

## 2017-04-17 NOTE — PROGRESS NOTES
HISTORY OF PRESENT ILLNESS  Elise Kent is a 60 y.o. female. HPI ESTABLISHED patient here for LEFT breast wound check. The area is healing. She no longer has any pain. Complains of itching at the sight. Incision is dry and intact. No swelling or redness. The incision feels hard. She has completed clindamycin. Last visit with Dr. Ginna Pulido 0/99/66. Prior aspiration to that visit by Dr. Marisa Sparks    1/5/16- LEFT breast lumpectomy, SLNB - T2N0  01/05/16: LT lumpectomy with SNBx for 2.4 cm, Grade I invasive ductal carcinoma and nuclear grade II DCIS. 0/1 LN involved. ER+/[place]+ Her2-. Low risk mammaprint. Clear margins. pT2 pN0 Mx.     03/03/16-04/05/16: s/p XRT      04/15/16: started Arimidex with Dr. Gonzalez Carry  Review of Systems   All other systems reviewed and are negative. Physical Exam   Pulmonary/Chest:       Erythema resolved. No fluctuance or induration. Nursing note and vitals reviewed. ASSESSMENT and PLAN    ICD-10-CM ICD-9-CM    1. Breast cancer of upper-outer quadrant of left female breast (HCC) C50.412 174.4    2. S/P lumpectomy, left breast Z90.12 V45.89      -observe for now. She has completed abx. She was instructed to contact our office with any new symptoms of erythema, fever, pain or swelling.

## 2017-04-27 ENCOUNTER — OFFICE VISIT (OUTPATIENT)
Dept: ONCOLOGY | Age: 57
End: 2017-04-27

## 2017-04-27 VITALS
HEART RATE: 90 BPM | SYSTOLIC BLOOD PRESSURE: 122 MMHG | OXYGEN SATURATION: 97 % | TEMPERATURE: 99 F | HEIGHT: 67 IN | RESPIRATION RATE: 16 BRPM | DIASTOLIC BLOOD PRESSURE: 80 MMHG | WEIGHT: 240 LBS | BODY MASS INDEX: 37.67 KG/M2

## 2017-04-27 DIAGNOSIS — Z79.811 AROMATASE INHIBITOR USE: ICD-10-CM

## 2017-04-27 DIAGNOSIS — C50.412 BREAST CANCER OF UPPER-OUTER QUADRANT OF LEFT FEMALE BREAST (HCC): Primary | ICD-10-CM

## 2017-04-27 DIAGNOSIS — Z98.890 S/P LUMPECTOMY, LEFT BREAST: ICD-10-CM

## 2017-04-27 DIAGNOSIS — F32.A DEPRESSION, UNSPECIFIED DEPRESSION TYPE: ICD-10-CM

## 2017-04-27 DIAGNOSIS — G47.30 SLEEP APNEA, UNSPECIFIED TYPE: ICD-10-CM

## 2017-04-27 RX ORDER — ANASTROZOLE 1 MG/1
1 TABLET ORAL DAILY
Qty: 90 TAB | Refills: 3 | Status: SHIPPED | OUTPATIENT
Start: 2017-04-27 | End: 2017-10-23 | Stop reason: SDUPTHER

## 2017-04-27 RX ORDER — DIPHENHYDRAMINE HCL 25 MG
25 CAPSULE ORAL
COMMUNITY
End: 2018-05-01

## 2017-04-27 NOTE — PROGRESS NOTES
Ladell Merlin is a 60 y.o. female here today for breast cancer f/u.  Patient states pain 2/10 in left breast; patient describes pain as a tingling/sharp

## 2017-04-27 NOTE — PROGRESS NOTES
HEME/ONC CONSULT       Adrian Flores is a 60 y.o. 1960 female and presents with Follow-up and Breast Cancer    CC breast cancer 12/15    HPI  Pt seen today for office 6 mo fu breast cancer on adjuvant hormonal therapy  C/o left breast pain/ low grade fever. Went to PCP and put on abx. Saw breast surgery and had breast drained. Pt here today stating that left breast is red/ pink again. Pt saw psychiatry and is on effexor and doing better overall.   mammo 10/16      DX   Encounter Diagnoses   Name Primary?     Breast cancer of upper-outer quadrant of left female breast ([facility] [address] Yes    S/P lumpectomy, left breast     Aromatase inhibitor use     Sleep apnea, unspecified type     Depression, unspecified depression type         STAGE: T2N0 ER+ HER2 neg mammoprint low risk    TREATMENT COURSE:     Lumpectomy for radiation and then AI      Past Medical History:   Diagnosis Date    Breast cancer ([facility] [address] 12/05/2015    IDC, DCIS    Cancer ([facility] [address]     LEFT BREAST    Fibrocystic breast disease     Psychiatric disorder 12/29/15    DEPRESSION    PUD (peptic ulcer disease)     OCCASIONAL DISCOMFORT-OCC TAKES ZANTAC    Radiation therapy complication 4737    Sleep apnea     USES CPAP     Past Surgical History:   Procedure Laterality Date    BREAST SURGERY PROCEDURE UNLISTED  1981    BREAST BIOPSY-CYST-LEFT    HX APPENDECTOMY  1973    HX BREAST LUMPECTOMY Left 1/5/2016    LEFT BREAST LUMPECTOMY WITH SENTINEL NODE BIOPSY WITH ULTRASOUND performed by Raul Nath MD at [address] HX COLONOSCOPY  X2    LATEST-2015    HX [address] [facility] EXTRACTION  1983     Social History     Social History    Marital status:      Spouse name: N/A    Number of children: N/A    Years of education: N/A     Social History Main Topics    Smoking status: Former Smoker     Packs/day: 0.25     Years: 30.00     Quit date: 12/29/2014    Smokeless tobacco: Never Used      Comment: HAS QUIT NUMEROUS TIMES    Alcohol use 2.4 oz/week     2 Glasses of wine, 2 Standard drinks or equivalent per week    Drug use: No      Comment: BEFORE AGE 23, USED HASH      Sexual activity: Not Asked     Other Topics Concern    None     Social History Narrative     Family History   Problem Relation Age of Onset    Cancer Mother      breast cancer & pancreatic cancer    Breast Cancer Mother 54    Heart Disease Father     Cancer Maternal Grandmother      breast cancer    Other Brother      leukemia     Other Brother      hepatitis C    Diabetes Sister     Diabetes Brother     Anesth Problems Neg Hx        Current Outpatient Prescriptions   Medication Sig Dispense Refill    diphenhydrAMINE (BENADRYL) 25 mg capsule Take 25 mg by mouth nightly as needed.  anastrozole (ARIMIDEX) 1 mg tablet Take 1 Tab by mouth daily. Indications: HORMONE RECEPTOR POSITIVE BREAST CANCER 90 Tab 3    venlafaxine-SR (EFFEXOR-XR) 150 mg capsule       DOCUSATE CALCIUM (STOOL SOFTENER PO) Take  by mouth.  anastrozole (ARIMIDEX) 1 mg tablet Take 1 Tab by mouth daily. Indications: HORMONE RECEPTOR POSITIVE BREAST CANCER 90 Tab 3    aspirin delayed-release 81 mg tablet Take  by mouth daily.  cholecalciferol (VITAMIN D3) 1,000 unit tablet Take 2,000 Units by mouth daily.  CALCIUM CARBONATE (CALCIUM 600 PO) Take 1,200 mg by mouth daily.  multivitamin (ONE A DAY) tablet Take 1 Tab by mouth daily.  VITAMIN E ACETATE (VITAMIN E PO) Take 1,000 Units by mouth daily.  ASCORBATE CALCIUM (VITAMIN C PO) Take 1,000 mg by mouth daily.  cyanocobalamin (VITAMIN B-12) 500 mcg tablet Take 500 mcg by mouth daily.          Allergies   Allergen Reactions    Pcn [Penicillins] Other (comments)     \"convulsions\"    Sulfa (Sulfonamide Antibiotics) Hives       Review of Systems    A comprehensive review of systems was negative except for: per HPI     Objective:  Visit Vitals    /80 (BP 1 Location: Right arm, BP Patient Position: Sitting)    Pulse 90    Temp 99 °F (37.2 °C) (Oral)    Resp 16    Ht 5' 7\" (1.702 m)    Wt 240 lb (108.9 kg)    SpO2 97%    BMI 37.59 kg/m2         Physical Exam:   General appearance - alert, well appearing, and in no distress, oriented to person, place, and time and overweight  Mental status - alert, oriented to person, place, and time, normal mood, behavior, speech, dress, motor activity, and thought processes  EYE-conj clear  Mouth - mucous membranes moist  Neck - supple  Chest - clear to auscultation  Heart - normal rate and regular rhythm   Abdomen - soft, nontender  Ext-no pedal edema noted  Skin-Warm and dry. Neuro -alert, oriented, normal speech, no focal findings or movement disorder noted  Breast - no masses palpable on right, left breast diffusely pink, warm, ? Seroma at lateral scar line    Diagnostic Imaging   reviewed  Results for orders placed during the hospital encounter of 03/04/14   XR CHEST PA LAT    Narrative **Final Report**       ICD Codes / Adm. Diagnosis: 786.2  780.60 / Cough  Fever, unspecified  Examination:  CR CHEST PA AND LATERAL  - [id number] - Mar  4 2014  4:42PM  Accession No:  [id number]  Reason:  cough      REPORT:  Indication:  REASON: Chest Pain    Exam: PA and lateral views of the chest.    There is no prior study for direct comparison. Findings: Cardiomediastinal silhouette is within normal limits. Lungs are   clear bilaterally. Pleural spaces are normal. Osseous structures are intact. IMPRESSION: No acute cardiopulmonary disease. Signing/Reading Doctor: DUONG Tariq ([id number])    Approved: DUONG COUGHLIN ([id number])  Mar  4 2014  4:46PM                                      No results found for this or any previous visit.     Lab Results  reviewed  Lab Results   Component Value Date/Time    WBC 7.4 12/29/2015 04:04 PM    HGB 12.9 12/29/2015 04:04 PM    HCT 37.5 12/29/2015 04:04 PM    PLATELET 178 00/82/0240 04:04 PM    MCV 92.4 12/29/2015 04:04 PM       Lab Results Component Value Date/Time    Sodium 140 12/29/2015 04:04 PM    Potassium 4.2 12/29/2015 04:04 PM    Chloride 104 12/29/2015 04:04 PM    CO2 29 12/29/2015 04:04 PM    Anion gap 7 12/29/2015 04:04 PM    Glucose 84 12/29/2015 04:04 PM    BUN 14 12/29/2015 04:04 PM    Creatinine 0.82 12/29/2015 04:04 PM    BUN/Creatinine ratio 17 12/29/2015 04:04 PM    GFR est AA >60 12/29/2015 04:04 PM    GFR est non-AA >60 12/29/2015 04:04 PM    Calcium 9.2 12/29/2015 04:04 PM       .    Assessment/Plan:        Geovanni Horta is a 60 y.o. 1960 female and presents with [facility] Care and Breast Cancer    CC breast cancer 12/15    HPI  Pt seen today for office consult for f/u breast cancer. STAGE: T2N0 ER+ HER2 neg mammoprint low risk    TREATMENT COURSE:     Lumpectomy for radiation/ then AI      1. T2N0 Er/Pr+ Her 2 nadine negative s/p lumpectomy/ mammoprint low risk luminal A     No evidence of recurrent cancer. Pt is on adjuvant hormonal therapy and tolerating this fine per pt. Pt will continue arimadex. Left breast is pink and warm today. Was seeing surgery/ on abx for infection finished last week/ had drain. Needs to f/u with surgery. D/w dr Vera Dumont who is in [address] [place] [address]. Wants pt to call surgery office to be seen by dr Jose Roberto Peters 10/16. Labs per PCP. 2.  fam hx of breast cancer/ pancreatic cancer in mom./  BRCA inconclusive     3. Sleep apnea/ depression on med. Per PCP. Better. Call if questions. F/u here in 3 months             ICD-10-CM ICD-9-CM    1. Breast cancer of upper-outer quadrant of left female breast (HCC) C50.412 174.4    2. S/P lumpectomy, left breast Z90.12 V45.89    3. Aromatase inhibitor use Z79.811 V07.52    4. Sleep apnea, unspecified type G47.30 780.57    5. Depression, unspecified depression type F32.9 311        Current Outpatient Prescriptions   Medication Sig    diphenhydrAMINE (BENADRYL) 25 mg capsule Take 25 mg by mouth nightly as needed.     anastrozole (ARIMIDEX) 1 mg tablet Take 1 Tab by mouth daily. Indications: HORMONE RECEPTOR POSITIVE BREAST CANCER    venlafaxine-SR (EFFEXOR-XR) 150 mg capsule     DOCUSATE CALCIUM (STOOL SOFTENER PO) Take  by mouth.  anastrozole (ARIMIDEX) 1 mg tablet Take 1 Tab by mouth daily. Indications: HORMONE RECEPTOR POSITIVE BREAST CANCER    aspirin delayed-release 81 mg tablet Take  by mouth daily.  cholecalciferol (VITAMIN D3) 1,000 unit tablet Take 2,000 Units by mouth daily.  CALCIUM CARBONATE (CALCIUM 600 PO) Take 1,200 mg by mouth daily.  multivitamin (ONE A DAY) tablet Take 1 Tab by mouth daily.  VITAMIN E ACETATE (VITAMIN E PO) Take 1,000 Units by mouth daily.  ASCORBATE CALCIUM (VITAMIN C PO) Take 1,000 mg by mouth daily.  cyanocobalamin (VITAMIN B-12) 500 mcg tablet Take 500 mcg by mouth daily. No current facility-administered medications for this visit. lose weight, increase physical activity, continue present plan, call if any problems  There are no Patient Instructions on file for this visit. Follow-up Disposition:  Return in about 3 months (around 7/27/2017).     Josie Lynn DO

## 2017-04-28 ENCOUNTER — OFFICE VISIT (OUTPATIENT)
Dept: SURGERY | Age: 57
End: 2017-04-28

## 2017-04-28 VITALS
HEIGHT: 67 IN | BODY MASS INDEX: 36.65 KG/M2 | SYSTOLIC BLOOD PRESSURE: 127 MMHG | HEART RATE: 73 BPM | DIASTOLIC BLOOD PRESSURE: 70 MMHG | WEIGHT: 233.5 LBS

## 2017-04-28 DIAGNOSIS — R23.4 BREAST SKIN CHANGES: Primary | ICD-10-CM

## 2017-04-28 NOTE — PROGRESS NOTES
HISTORY OF PRESENT ILLNESS  Asa Braun is a 60 y.o. female. HPI ESTABLISHED patient here today for complaint of LEFT breast redness, warmth and pain. LEFT lumpectomy 1/2016. One month ago she had LEFT breast pain and erythema. Dr Kiki Shabazz aspirated 6 cc purulent drainage. Follow up visits erythema had resolved. Dr Marino John thought the breast was looking red again. She has pain 3/10 with palpation. The nipple on the LEFT breast itches frequently and is very bothersome. The patient denies any palpable lumps or nipple inversion or discharge. 01/05/16: LT lumpectomy with SNBx for 2.4 cm, Grade I invasive ductal carcinoma and nuclear grade II DCIS. 0/1 LN involved. ER+/AZ+ Her2-. Low risk mammaprint. Clear margins. pT2 pN0 Mx.    03/03/16-04/05/16: s/p XRT     04/15/16: started Arimidex with Dr. Michael Morales 10/2017 BIRADS 2  ROS    Physical Exam   Pulmonary/Chest: Right breast exhibits no inverted nipple, no mass, no nipple discharge, no skin change and no tenderness. Left breast exhibits skin change (the LEFT breast is slightly pink). Left breast exhibits no inverted nipple, no mass, no nipple discharge and no tenderness. Breasts are symmetrical.       Lymphadenopathy:     She has no cervical adenopathy. She has no axillary adenopathy. Right: No supraclavicular adenopathy present. Left: No supraclavicular adenopathy present. BREAST ULTRASOUND  Indication: Left  breast mild erythema  Technique: The area was scanned using a high-frequency linear-array near-field transducer  Findings: 2.5 cm oval heterogeneous mass/scar tissue at lumpectomy site. 1.7 cm oval scar tissue in the axilla. No abscess  Impression: abscess resolved  Disposition: No worrisome finding on ultrasound  ASSESSMENT and PLAN    ICD-10-CM ICD-9-CM    1. Breast skin changes R23.4 782.8      Breast abscess/erythema resolved. The LEFT breast skin does look slightly pink but this is due to radiation change.   No evidence of infection.

## 2017-04-28 NOTE — MR AVS SNAPSHOT
Visit Information Date & Time Provider Department Dept. Phone Encounter #  
 4/28/2017 11:00 AM MANDY Rawls [address] [facility] [phone] [id number] Upcoming Health Maintenance Date Due Hepatitis C Screening 1960 Pneumococcal 19-64 Highest Risk (1 of 3 - PCV13) 9/12/1977 DTaP/Tdap/Td series (1 - Tdap) 9/12/1979 PAP AKA CERVICAL CYTOLOGY 9/12/1979 FOBT Q 1 YEAR AGE 50-75 9/12/2008 INFLUENZA AGE 9 TO ADULT 8/1/2016 BREAST CANCER SCRN MAMMOGRAM 10/3/2018 Allergies as of 4/28/2017  Review Complete On: 4/28/2017 By: Giselle Hyde RN Severity Noted Reaction Type Reactions Pcn [Penicillins] High 12/14/2015    Other (comments) \"convulsions\" Sulfa (Sulfonamide Antibiotics)  12/14/2015    Hives Current Immunizations  Reviewed on 9/13/2016 No immunizations on file. Not reviewed this visit Vitals BP Pulse Height(growth percentile) Weight(growth percentile) BMI OB Status 127/70 73 5' 7\" (1.702 m) 233 lb 8 oz (105.9 kg) 36.57 kg/m2 Postmenopausal  
 Smoking Status Former Smoker BMI and BSA Data Body Mass Index Body Surface Area  
 36.57 kg/m 2 2.24 m 2 Preferred Pharmacy Pharmacy Name [address] [facility] [phone] Your Updated Medication List  
  
   
This list is accurate as of: 4/28/17 11:20 AM.  Always use your most recent med list.  
  
  
  
  
 * anastrozole 1 mg tablet Commonly known as:  ARIMIDEX Take 1 Tab by mouth daily. Indications: HORMONE RECEPTOR POSITIVE BREAST CANCER  
  
 * anastrozole 1 mg tablet Commonly known as:  ARIMIDEX Take 1 Tab by mouth daily. Indications: HORMONE RECEPTOR POSITIVE BREAST CANCER  
  
 aspirin delayed-release 81 mg tablet Take  by mouth daily. BENADRYL 25 mg capsule Generic drug:  diphenhydrAMINE Take 25 mg by mouth nightly as needed. CALCIUM 600 PO Take 1,200 mg by mouth daily. cholecalciferol 1,000 unit tablet Commonly known as:  VITAMIN D3 Take 2,000 Units by mouth daily. multivitamin tablet Commonly known as:  ONE A DAY Take 1 Tab by mouth daily. STOOL SOFTENER PO Take  by mouth. venlafaxine- mg capsule Commonly known as:  EFFEXOR-XR  
  
 VITAMIN B-12 500 mcg tablet Generic drug:  cyanocobalamin Take 500 mcg by mouth daily. VITAMIN C PO Take 1,000 mg by mouth daily. VITAMIN E PO Take 1,000 Units by mouth daily. * Notice: This list has 2 medication(s) that are the same as other medications prescribed for you. Read the directions carefully, and ask your doctor or other care provider to review them with you. Introducing [address]! Dear Genesis Ponce: Thank you for requesting a [employer] account. Our records indicate that you have previously registered for a [employer] account but its currently inactive. Please call our [employer] support line at [phone]. Additional Information If you have questions, please visit the Frequently Asked Questions section of the [employer] website at [email or web address]. [employer] Remember, [employer] is NOT to be used for urgent needs. For medical emergencies, dial 911. Now available from your iPhone and Android! Please provide this summary of care documentation to your next provider. Your primary care clinician is listed as [address]. If you have any questions after today's visit, please call [phone].

## 2017-04-28 NOTE — PATIENT INSTRUCTIONS
Breast Pain: Care Instructions  Your Care Instructions  Breast tenderness and pain may come and go with your monthly periods (cyclic), or it may not follow any pattern (noncyclic). Breast pain is rarely caused by a serious health problem. You may need tests to find the cause. Follow-up care is a key part of your treatment and safety. Be sure to make and go to all appointments, and call your doctor if you are having problems. Its also a good idea to know your test results and keep a list of the medicines you take. How can you care for yourself at home? · If your doctor gave you medicine, take it exactly as prescribed. Call your doctor if you think you are having a problem with your medicine. · Take an over-the-counter pain medicine, such as acetaminophen (Tylenol), ibuprofen (Advil, Motrin), or naproxen (Aleve), to relieve pain and swelling. Read and follow all instructions on the label. · Do not take two or more pain medicines at the same time unless the doctor told you to. Many pain medicines have acetaminophen, which is Tylenol. Too much acetaminophen (Tylenol) can be harmful. · Wear a supportive bra, such as a sports bra or a jog bra. · Cut down on the amount of fat in your diet. If you need help planning healthy meals, see a dietitian. · Get at least 30 minutes of exercise on most days of the week. Walking is a good choice. You also may want to do other activities, such as running, swimming, cycling, or playing tennis or team sports. · Keep a healthy sleep pattern. Go to bed at the same time every night, and get up at the same time every day. When should you call for help? Call your doctor now or seek immediate medical care if:  · You have new changes in a breast, such as:  ¨ A lump or thickening in your breast or armpit. ¨ A change in the breast's size or shape. ¨ Skin changes, such as dimples or puckers. ¨ Nipple discharge.   ¨ A change in the color or feel of the skin of your breast or the darker area around the nipple (areola). ¨ A change in the shape of the nipple (it may look like it's being pulled into the breast). · You have symptoms of a breast infection, such as:  ¨ Increased pain, swelling, redness, or warmth around a breast.  ¨ Red streaks extending from the breast.  ¨ Pus draining from a breast.  ¨ A fever. Watch closely for changes in your health, and be sure to contact your doctor if:  · Your breast pain does not get better after 1 week. · You have a lump or thickening in your breast or armpit. Where can you learn more? Go to http://camila-yony.info/. Enter G519 in the search box to learn more about \"Breast Pain: Care Instructions. \"  Current as of: May 27, 2016  Content Version: 11.2  © 9851-9471 [employer]. Care instructions adapted under license by [employer] (which disclaims liability or warranty for this information). If you have questions about a medical condition or this instruction, always ask your healthcare professional. [address] any warranty or liability for your use of this information.

## 2017-04-28 NOTE — COMMUNICATION BODY
HISTORY OF PRESENT ILLNESS  Ladell Merlin is a 60 y.o. female. HPI ESTABLISHED patient here today for complaint of LEFT breast redness, warmth and pain. LEFT lumpectomy 1/2016. One month ago she had LEFT breast pain and erythema. Dr Quyen Terrell aspirated 6 cc purulent drainage. Follow up visits erythema had resolved. Dr Kel Siu thought the breast was looking red again. She has pain 3/10 with palpation. The nipple on the LEFT breast itches frequently and is very bothersome. The patient denies any palpable lumps or nipple inversion or discharge. 01/05/16: LT lumpectomy with SNBx for 2.4 cm, Grade I invasive ductal carcinoma and nuclear grade II DCIS. 0/1 LN involved. ER+/NV+ Her2-. Low risk mammaprint. Clear margins. pT2 pN0 Mx.    03/03/16-04/05/16: s/p XRT     04/15/16: started Arimidex with Dr. Netta Butterfield 10/2017 BIRADS 2  ROS    Physical Exam   Pulmonary/Chest: Right breast exhibits no inverted nipple, no mass, no nipple discharge, no skin change and no tenderness. Left breast exhibits skin change (the LEFT breast is slightly pink). Left breast exhibits no inverted nipple, no mass, no nipple discharge and no tenderness. Breasts are symmetrical.       Lymphadenopathy:     She has no cervical adenopathy. She has no axillary adenopathy. Right: No supraclavicular adenopathy present. Left: No supraclavicular adenopathy present. BREAST ULTRASOUND  Indication: Left  breast mild erythema  Technique: The area was scanned using a high-frequency linear-array near-field transducer  Findings: 2.5 cm oval heterogeneous mass/scar tissue at lumpectomy site. 1.7 cm oval scar tissue in the axilla. No abscess  Impression: abscess resolved  Disposition: No worrisome finding on ultrasound  ASSESSMENT and PLAN    ICD-10-CM ICD-9-CM    1. Breast skin changes R23.4 782.8      Breast abscess/erythema resolved. The LEFT breast skin does look slightly pink but this is due to radiation change.   No evidence of infection.

## 2017-10-11 DIAGNOSIS — Z85.3 HISTORY OF LEFT BREAST CANCER: Primary | ICD-10-CM

## 2017-10-19 ENCOUNTER — HOSPITAL ENCOUNTER (OUTPATIENT)
Dept: MAMMOGRAPHY | Age: 57
Discharge: HOME | End: 2017-10-19
Attending: SURGERY

## 2017-10-19 DIAGNOSIS — Z85.3 HISTORY OF LEFT BREAST CANCER: ICD-10-CM

## 2017-10-23 ENCOUNTER — HOSPITAL ENCOUNTER (OUTPATIENT)
Dept: MAMMOGRAPHY | Age: 57
Discharge: HOME | End: 2017-10-23
Attending: SURGERY
Payer: COMMERCIAL

## 2017-10-23 ENCOUNTER — OFFICE VISIT (OUTPATIENT)
Dept: SURGERY | Age: 57
End: 2017-10-23

## 2017-10-23 VITALS
BODY MASS INDEX: 36.73 KG/M2 | HEART RATE: 85 BPM | HEIGHT: 67 IN | SYSTOLIC BLOOD PRESSURE: 124 MMHG | DIASTOLIC BLOOD PRESSURE: 54 MMHG | WEIGHT: 234 LBS

## 2017-10-23 DIAGNOSIS — Z17.0 MALIGNANT NEOPLASM OF UPPER-OUTER QUADRANT OF LEFT BREAST IN FEMALE, ESTROGEN RECEPTOR POSITIVE (HCC): Primary | ICD-10-CM

## 2017-10-23 DIAGNOSIS — Z17.0 MALIGNANT NEOPLASM OF UPPER-OUTER QUADRANT OF LEFT BREAST IN FEMALE, ESTROGEN RECEPTOR POSITIVE (HCC): ICD-10-CM

## 2017-10-23 DIAGNOSIS — C50.412 MALIGNANT NEOPLASM OF UPPER-OUTER QUADRANT OF LEFT BREAST IN FEMALE, ESTROGEN RECEPTOR POSITIVE (HCC): Primary | ICD-10-CM

## 2017-10-23 DIAGNOSIS — C50.412 MALIGNANT NEOPLASM OF UPPER-OUTER QUADRANT OF LEFT BREAST IN FEMALE, ESTROGEN RECEPTOR POSITIVE (HCC): ICD-10-CM

## 2017-10-23 DIAGNOSIS — Z98.890 S/P LUMPECTOMY, LEFT BREAST: ICD-10-CM

## 2017-10-23 DIAGNOSIS — N64.4 BREAST PAIN: ICD-10-CM

## 2017-10-23 PROCEDURE — 76642 ULTRASOUND BREAST LIMITED: CPT

## 2017-10-23 PROCEDURE — 77066 DX MAMMO INCL CAD BI: CPT

## 2017-10-23 RX ORDER — RANITIDINE 300 MG/1
TABLET ORAL
COMMUNITY
Start: 2017-09-25 | End: 2019-06-07 | Stop reason: ALTCHOICE

## 2017-10-23 RX ORDER — BETAMETHASONE DIPROPIONATE 0.5 MG/G
CREAM TOPICAL
COMMUNITY
Start: 2017-09-25 | End: 2018-05-01

## 2017-10-23 RX ORDER — VENLAFAXINE HYDROCHLORIDE 75 MG/1
150 CAPSULE, EXTENDED RELEASE ORAL
COMMUNITY
Start: 2017-10-04 | End: 2019-06-07 | Stop reason: ALTCHOICE

## 2017-10-23 RX ORDER — MONTELUKAST SODIUM 10 MG/1
TABLET ORAL
COMMUNITY
Start: 2017-09-25 | End: 2018-05-01

## 2017-10-23 NOTE — PATIENT INSTRUCTIONS
Breast Self-Exam: Care Instructions  Your Care Instructions  A breast self-exam is when you check your breasts for lumps or changes. This regular exam helps you learn how your breasts normally look and feel. Most breast problems or changes are not because of cancer. Breast self-exam is not a substitute for a mammogram. Having regular breast exams by your doctor and regular mammograms improve your chances of finding any problems with your breasts. Some women set a time each month to do a step-by-step breast self-exam. Other women like a less formal system. They might look at their breasts as they brush their teeth, or feel their breasts once in a while in the shower. If you notice a change in your breast, tell your doctor. Follow-up care is a key part of your treatment and safety. Be sure to make and go to all appointments, and call your doctor if you are having problems. Its also a good idea to know your test results and keep a list of the medicines you take. How do you do a breast self-exam?  · The best time to examine your breasts is usually one week after your menstrual period begins. Your breasts should not be tender then. If you do not have periods, you might do your exam on a day of the month that is easy to remember. · To examine your breasts:  ¨ Remove all your clothes above the waist and lie down. When you are lying down, your breast tissue spreads evenly over your chest wall, which makes it easier to feel all your breast tissue. ¨ Use the pads--not the fingertips--of the 3 middle fingers of your left hand to check your right breast. Move your fingers slowly in small coin-sized circles that overlap. ¨ Use three levels of pressure to feel of all your breast tissue. Use light pressure to feel the tissue close to the skin surface. Use medium pressure to feel a little deeper. Use firm pressure to feel your tissue close to your breastbone and ribs.  Use each pressure level to feel your breast tissue before moving on to the next spot. ¨ Check your entire breast, moving up and down as if following a strip from the collarbone to the bra line, and from the armpit to the ribs. Repeat until you have covered the entire breast.  ¨ Repeat this procedure for your left breast, using the pads of the 3 middle fingers of your right hand. · To examine your breasts while in the shower:  ¨ Place one arm over your head and lightly soap your breast on that side. ¨ Using the pads of your fingers, gently move your hand over your breast (in the strip pattern described above), feeling carefully for any lumps or changes. ¨ Repeat for the other breast.  · Have your doctor inspect anything you notice to see if you need further testing. Where can you learn more? Go to http://camila-yony.info/. Enter P148 in the search box to learn more about \"Breast Self-Exam: Care Instructions. \"  Current as of: July 26, 2016  Content Version: 11.3  © 5651-0496 [employer]. Care instructions adapted under license by [employer] (which disclaims liability or warranty for this information). If you have questions about a medical condition or this instruction, always ask your healthcare professional. [place] [address] any warranty or liability for your use of this information.

## 2017-10-23 NOTE — PROGRESS NOTES
HISTORY OF PRESENT ILLNESS  Leroy Heath is a 59 y.o. female. HPI ESTABLISHED patient here for LEFT breast tenderness. She is s/p LEFT breast lumpectomy, xrt. Taking Arimidex. Recent change of anti depressant (now taking Effexor) and this is causing her to have hives. She is due for a mammogram.      History of breast cancer 2015-  LEFT breast IDC - ER positive, DE positive, Her2 negative, LN negative. 1/5/16- LEFT breast lumpectomy, SLNB - T2N0  Mammaprint low risk luminal A. No chemotherapy. 4/5/16- completed radiation. Followed by Dr. Weston Mcknight. 5/3/16- started Arimidex. Followed by Dr. Guzman Course. ROS    Physical Exam   Pulmonary/Chest: Right breast exhibits no inverted nipple, no mass, no nipple discharge, no skin change and no tenderness. Left breast exhibits mass (thickening left breast 8:00 with tenderness. lumpectomy 3:00 no erythema or fluid. biosorb intact) and tenderness. Left breast exhibits no inverted nipple, no nipple discharge and no skin change. Breasts are symmetrical.       Lymphadenopathy:     She has no cervical adenopathy. She has no axillary adenopathy. Nursing note and vitals reviewed. ASSESSMENT and PLAN    ICD-10-CM ICD-9-CM    1. Malignant neoplasm of upper-outer quadrant of left breast in female, estrogen receptor positive (HCC) C50.412 174.4     Z17.0 V86.0    2. S/P lumpectomy, left breast Z98.890 V45.89      - schedule diagnostic mammos, left us for today  - continue nsaids and heat. Likely chest wall pain secondary to radiation.

## 2017-11-02 ENCOUNTER — TELEPHONE (OUTPATIENT)
Dept: ONCOLOGY | Age: 57
End: 2017-11-02

## 2017-11-02 NOTE — TELEPHONE ENCOUNTER
Patient called and stated that she has had hives for the last 4 months. The hives are red and itchy. Patient sated that she went to her PCP in August, who prescribed her 2 rounds or prednisone that worked for a little while, then the hives came back. Patient then went to the dermatologist in September, who prescribed her Singulair as well as instructed her to continue to take Benadryl. Once the Singulair ran out the patient started taking Zyrtec with the benadryl. Patient cut back the Effexor, but that did not help either. Patient reported that she stopped taking the Effexor completely last Saturday but that did not help either. The rash seems to be mainly on the abdomen, behind, and legs but patient also reports that occasionally she gets them on her face, and upper body. Patient did not take the anastrozole this morning, thinking that may be causing the rash. Patient would like to know if we can refer her to an allergist to test if she has an allergy to any of her medications.  # [phone]

## 2017-11-02 NOTE — TELEPHONE ENCOUNTER
Call returned to patient. ML to return call. (OK to stop arimidex for week or two to see if rash improves. Defer to PCP for referral to allergist if needed.   Ask patient to call in week or two to report response.)

## 2018-05-01 ENCOUNTER — OFFICE VISIT (OUTPATIENT)
Dept: SURGERY | Age: 58
End: 2018-05-01

## 2018-05-01 VITALS
HEIGHT: 67 IN | WEIGHT: 238 LBS | HEART RATE: 85 BPM | DIASTOLIC BLOOD PRESSURE: 51 MMHG | SYSTOLIC BLOOD PRESSURE: 112 MMHG | BODY MASS INDEX: 37.35 KG/M2

## 2018-05-01 DIAGNOSIS — Z17.0 MALIGNANT NEOPLASM OF UPPER-OUTER QUADRANT OF LEFT BREAST IN FEMALE, ESTROGEN RECEPTOR POSITIVE (HCC): Primary | ICD-10-CM

## 2018-05-01 DIAGNOSIS — C50.412 MALIGNANT NEOPLASM OF UPPER-OUTER QUADRANT OF LEFT BREAST IN FEMALE, ESTROGEN RECEPTOR POSITIVE (HCC): Primary | ICD-10-CM

## 2018-05-01 DIAGNOSIS — Z98.890 S/P LUMPECTOMY, LEFT BREAST: ICD-10-CM

## 2018-05-01 NOTE — MR AVS SNAPSHOT
[address] [facility] G11 [address] [place] [address] 
[phone] Patient: Addison Watts MRN: [id number] [facility]:[birth year] Visit Information Date & Time Provider Department Dept. Phone Encounter #  
 5/1/2018  3:00 PM Abhi Mejia NP [address] at [facility]  Upcoming Health Maintenance Date Due Hepatitis C Screening 1960 Pneumococcal 19-64 Highest Risk (1 of 3 - PCV13) 9/12/1977 DTaP/Tdap/Td series (1 - Tdap) 9/12/1979 PAP AKA CERVICAL CYTOLOGY 9/12/1979 FOBT Q 1 YEAR AGE 50-75 9/12/2008 Influenza Age 5 to Adult 8/1/2018 BREAST CANCER SCRN MAMMOGRAM 10/23/2019 Allergies as of 5/1/2018  Review Complete On: 5/1/2018 By: Yane Valle RN Severity Noted Reaction Type Reactions Pcn [Penicillins] High 12/14/2015    Other (comments) \"convulsions\" Sulfa (Sulfonamide Antibiotics)  12/14/2015    Hives Current Immunizations  Reviewed on 9/13/2016 No immunizations on file. Not reviewed this visit Vitals BP Pulse Height(growth percentile) Weight(growth percentile) BMI OB Status 112/51 85 5' 7\" (1.702 m) 238 lb (108 kg) 37.28 kg/m2 Postmenopausal  
 Smoking Status Former Smoker BMI and BSA Data Body Mass Index Body Surface Area  
 37.28 kg/m 2 2.26 m 2 Preferred Pharmacy Pharmacy Name Phone [facility], [facility] [phone] Your Updated Medication List  
  
   
This list is accurate as of 5/1/18  3:42 PM.  Always use your most recent med list.  
  
  
  
  
 anastrozole 1 mg tablet Commonly known as:  ARIMIDEX Take 1 Tab by mouth daily. Indications: HORMONE RECEPTOR POSITIVE BREAST CANCER  
  
 aspirin delayed-release 81 mg tablet Take  by mouth daily. CALCIUM 600 PO Take 1,200 mg by mouth daily. cholecalciferol 1,000 unit tablet Commonly known as:  VITAMIN D3 Take 2,000 Units by mouth daily. multivitamin tablet Commonly known as:  ONE A DAY Take 1 Tab by mouth daily. raNITIdine 300 mg tablet Commonly known as:  ZANTAC  
  
 STOOL SOFTENER PO Take  by mouth. venlafaxine-SR 75 mg capsule Commonly known as:  EFFEXOR-XR  
150 mg. VITAMIN E PO Take 1,000 Units by mouth daily. Patient Instructions Breast Self-Exam: Care Instructions Your Care Instructions A breast self-exam is when you check your breasts for lumps or changes. This regular exam helps you learn how your breasts normally look and feel. Most breast problems or changes are not because of cancer. Breast self-exam is not a substitute for a mammogram. Having regular breast exams by your doctor and regular mammograms improve your chances of finding any problems with your breasts. Some women set a time each month to do a step-by-step breast self-exam. Other women like a less formal system. They might look at their breasts as they brush their teeth, or feel their breasts once in a while in the shower. If you notice a change in your breast, tell your doctor. Follow-up care is a key part of your treatment and safety. Be sure to make and go to all appointments, and call your doctor if you are having problems. It's also a good idea to know your test results and keep a list of the medicines you take. How do you do a breast self-exam? 
· The best time to examine your breasts is usually one week after your menstrual period begins. Your breasts should not be tender then. If you do not have periods, you might do your exam on a day of the month that is easy to remember. · To examine your breasts: ¨ Remove all your clothes above the waist and lie down. When you are lying down, your breast tissue spreads evenly over your chest wall, which makes it easier to feel all your breast tissue. ¨ Use the pads-not the fingertips-of the 3 middle fingers of your left hand to check your right breast. Move your fingers slowly in small coin-sized circles that overlap. ¨ Use three levels of pressure to feel of all your breast tissue. Use light pressure to feel the tissue close to the skin surface. Use medium pressure to feel a little deeper. Use firm pressure to feel your tissue close to your breastbone and ribs. Use each pressure level to feel your breast tissue before moving on to the next spot. ¨ Check your entire breast, moving up and down as if following a strip from the collarbone to the bra line, and from the armpit to the ribs. Repeat until you have covered the entire breast. 
¨ Repeat this procedure for your left breast, using the pads of the 3 middle fingers of your right hand. · To examine your breasts while in the shower: 
¨ Place one arm over your head and lightly soap your breast on that side. ¨ Using the pads of your fingers, gently move your hand over your breast (in the strip pattern described above), feeling carefully for any lumps or changes. ¨ Repeat for the other breast. 
· Have your doctor inspect anything you notice to see if you need further testing. Where can you learn more? Go to http://camila-yony.info/. Enter P148 in the search box to learn more about \"Breast Self-Exam: Care Instructions. \" Current as of: May 12, 2017 Content Version: 11.4 © 4468-7964 [employer]. Care instructions adapted under license by [employer] (which disclaims liability or warranty for this information). If you have questions about a medical condition or this instruction, always ask your healthcare professional. [facility] [address] any warranty or liability for your use of this information. Introducing [facility] [place] [facility]!    
 The [facility] [employer] introduces Game Digital patient portal. Now you can access parts of your medical record, email your doctor's office, and request medication refills online. 1. In your internet browser, go to [email or web address] 2. Click on the First Time User? Click Here link in the Sign In box. You will see the New Member Sign Up page. 3. Enter your Happy [employer] Access Code exactly as it appears below. You will not need to use this code after youve completed the sign-up process. If you do not sign up before the expiration date, you must request a new code. · [employer] Access Code: [email or web address] Expires: 7/30/2018  2:58 PM 
 
4. Enter the last four digits of your Social Security Number (xxxx) and Date of Birth (mm/dd/yyyy) as indicated and click Submit. You will be taken to the next sign-up page. 5. Create a Happy [employer] ID. This will be your [employer] login ID and cannot be changed, so think of one that is secure and easy to remember. 6. Create a [employer] password. You can change your password at any time. 7. Enter your Password Reset Question and Answer. This can be used at a later time if you forget your password. 8. Enter your e-mail address. You will receive e-mail notification when new information is available in [address] 9. Click Sign Up. You can now view and download portions of your medical record. 10. Click the Download Summary menu link to download a portable copy of your medical information. If you have questions, please visit the Frequently Asked Questions section of the [employer] website. Remember, [employer] is NOT to be used for urgent needs. For medical emergencies, dial 911. Now available from your iPhone and Android! Please provide this summary of care documentation to your next provider. Your primary care clinician is listed as [address]. If you have any questions after today's visit, please call [phone].

## 2018-05-01 NOTE — PROGRESS NOTES
HISTORY OF PRESENT ILLNESS  Addison Watts is a 59 y.o. female. Breast Cancer     ESTABLISHED patient here today for LEFT breast concern. She feels occasional LEFT breast lateral pain. Denies any breast swelling or redness. Currently taking Arimidex and tolerating well. History of breast cancer-  LEFT breast IDC - ER positive, CA positive, Her2 negative, LN negative. 1/5/16- LEFT breast lumpectomy, SLNB - T2N0  Mammaprint low risk luminal A. No chemotherapy. 4/5/16- completed radiation. Followed by Dr. Davin Moctezuma. 5/3/16- started Arimidex. Followed by Dr. Asfi Sanchez. OB History     Obstetric Comments    Menarche:  13. LMP: 50.  # of Children:  0. Age at Delivery of First Child:  n/a. Hysterectomy/oophorectomy:  NO/NO. Breast Bx:  yes. Hx of Breast Feeding:  no. BCP:  yes. Hormone therapy:  ? Bijal Franco Past Surgical History:   Procedure Laterality Date    BREAST SURGERY PROCEDURE UNLISTED  1981    BREAST BIOPSY-CYST-LEFT    HX APPENDECTOMY  1973    HX BREAST BIOPSY Left     20 yo - benign    HX BREAST LUMPECTOMY Left 1/5/2016    LEFT BREAST LUMPECTOMY WITH SENTINEL NODE BIOPSY WITH ULTRASOUND performed by Maximiliano Barker MD at [facility] 11    HX COLONOSCOPY  X2    LATEST-2015    HX WISDOM TEETH EXTRACTION  24     FH includes-  Mother diagnosed with pancreatic cancer at age 47 and diagnosed with breast cancer at age 54. Maternal grandmother diagnosed with breast cancer in her 62s. Patient has had genetic testing- VUS of MUTYH    Recent imaging-   Bilateral diagnostic mammogram and LEFT breast ultrasound on 10/23/17- BIRADS 2  FINDINGS: Bilateral digital diagnostic mammography was performed, and is  interpreted in conjunction with a computer assisted detection (CAD) system. Lumpectomy changes are noted in the left breast. Magnification views show  evolving post lumpectomy changes within the left axillary tail, with no  suspicious microcalcifications.  Left breast skin thickening is unchanged, and  likely sequela of radiation therapy. There are no suspicious findings within the  region of pain in the medial left breast. No new masses or suspicious  calcifications are identified within the right breast.   Left breast ultrasound was performed of the region of pain, the 8:00 location, 3  cm from the nipple. There is diffuse skin thickening but no suspicious  underlying lesions within the breasts. There are no focal fluid collections. IMPRESSION:  1. BI-RADS Assessment Category 2: Benign finding. Postlumpectomy changes within  left axillary tail, evolving in a benign fashion. No mammographic evidence of  recurrent malignancy. 2. In the region of focal pain in the medial left breast, there is diffuse skin  thickening which is not significantly changed. This is likely the sequela of  radiation therapy. 3. No mammographic evidence of malignancy bilaterally. ROS    Physical Exam   Constitutional: She appears well-developed and well-nourished. Pulmonary/Chest: Right breast exhibits no inverted nipple, no mass, no nipple discharge, no skin change and no tenderness. Left breast exhibits tenderness. Left breast exhibits no inverted nipple, no mass, no nipple discharge and no skin change. Musculoskeletal: Normal range of motion. UE x 2   Lymphadenopathy:     She has no cervical adenopathy. She has no axillary adenopathy. Right: No supraclavicular adenopathy present. Left: No supraclavicular adenopathy present. Skin: Skin is warm, dry and intact. Chest and breasts examined   Psychiatric: She has a normal mood and affect. Her speech is normal and behavior is normal.     Visit Vitals    /51    Pulse 85    Ht 5' 7\" (1.702 m)    Wt 238 lb (108 kg)    BMI 37.28 kg/m2     ASSESSMENT and PLAN  Encounter Diagnoses   Name Primary?     Malignant neoplasm of upper-outer quadrant of left breast in female, estrogen receptor positive ([facility] [address] Yes    S/P lumpectomy, left breast      Normal exam and imaging with no evidence of local recurrence. Discussed normal tenderness to breast after treatment and worrisome s/sx. She will plan to have a BDmammogram and RTC here in 6 months. She is comfortable with this plan. All questions answered and she stated understanding.

## 2018-05-01 NOTE — PATIENT INSTRUCTIONS
Breast Self-Exam: Care Instructions  Your Care Instructions    A breast self-exam is when you check your breasts for lumps or changes. This regular exam helps you learn how your breasts normally look and feel. Most breast problems or changes are not because of cancer. Breast self-exam is not a substitute for a mammogram. Having regular breast exams by your doctor and regular mammograms improve your chances of finding any problems with your breasts. Some women set a time each month to do a step-by-step breast self-exam. Other women like a less formal system. They might look at their breasts as they brush their teeth, or feel their breasts once in a while in the shower. If you notice a change in your breast, tell your doctor. Follow-up care is a key part of your treatment and safety. Be sure to make and go to all appointments, and call your doctor if you are having problems. It's also a good idea to know your test results and keep a list of the medicines you take. How do you do a breast self-exam?  · The best time to examine your breasts is usually one week after your menstrual period begins. Your breasts should not be tender then. If you do not have periods, you might do your exam on a day of the month that is easy to remember. · To examine your breasts:  ¨ Remove all your clothes above the waist and lie down. When you are lying down, your breast tissue spreads evenly over your chest wall, which makes it easier to feel all your breast tissue. ¨ Use the pads-not the fingertips-of the 3 middle fingers of your left hand to check your right breast. Move your fingers slowly in small coin-sized circles that overlap. ¨ Use three levels of pressure to feel of all your breast tissue. Use light pressure to feel the tissue close to the skin surface. Use medium pressure to feel a little deeper. Use firm pressure to feel your tissue close to your breastbone and ribs.  Use each pressure level to feel your breast tissue before moving on to the next spot. ¨ Check your entire breast, moving up and down as if following a strip from the collarbone to the bra line, and from the armpit to the ribs. Repeat until you have covered the entire breast.  ¨ Repeat this procedure for your left breast, using the pads of the 3 middle fingers of your right hand. · To examine your breasts while in the shower:  ¨ Place one arm over your head and lightly soap your breast on that side. ¨ Using the pads of your fingers, gently move your hand over your breast (in the strip pattern described above), feeling carefully for any lumps or changes. ¨ Repeat for the other breast.  · Have your doctor inspect anything you notice to see if you need further testing. Where can you learn more? Go to http://camila-yony.info/. Enter P148 in the search box to learn more about \"Breast Self-Exam: Care Instructions. \"  Current as of: May 12, 2017  Content Version: 11.4  © 6340-3885 [employer]. Care instructions adapted under license by [employer] (which disclaims liability or warranty for this information). If you have questions about a medical condition or this instruction, always ask your healthcare professional. [place] [address] any warranty or liability for your use of this information.

## 2018-05-01 NOTE — PROGRESS NOTES
HISTORY OF PRESENT ILLNESS  Emilia Power is a 59 y.o. female. HPI   ESTABLISHED patient here today for LEFT breast concern. She feels occasional LEFT breast lateral pain. Denies any breast swelling or redness. Currently taking Arimidex and tolerating well. History of breast cancer-  LEFT breast IDC - ER positive, GA positive, Her2 negative, LN negative. 1/5/16- LEFT breast lumpectomy, SLNB - T2N0  Mammaprint low risk luminal A. No chemotherapy. 4/5/16- completed radiation. Followed by Dr. Marysol Zhu. 5/3/16- started Arimidex. Followed by Dr. Teresa Latif. FH includes-  Mother diagnosed with pancreatic cancer at age 47 and diagnosed with breast cancer at age 54. Maternal grandmother diagnosed with breast cancer in her 62s. Patient has had genetic testing- VUS of MUTYH    Recent imaging-   Bilateral diagnostic mammogram and LEFT breast ultrasound on 10/23/17- BIRADS 2  FINDINGS: Bilateral digital diagnostic mammography was performed, and is  interpreted in conjunction with a computer assisted detection (CAD) system. Lumpectomy changes are noted in the left breast. Magnification views show  evolving post lumpectomy changes within the left axillary tail, with no  suspicious microcalcifications. Left breast skin thickening is unchanged, and  likely sequela of radiation therapy. There are no suspicious findings within the  region of pain in the medial left breast. No new masses or suspicious  calcifications are identified within the right breast.   Left breast ultrasound was performed of the region of pain, the 8:00 location, 3  cm from the nipple. There is diffuse skin thickening but no suspicious  underlying lesions within the breasts. There are no focal fluid collections. IMPRESSION:  1. BI-RADS Assessment Category 2: Benign finding. Postlumpectomy changes within  left axillary tail, evolving in a benign fashion. No mammographic evidence of  recurrent malignancy.   2. In the region of focal pain in the How Did The Hair Loss Occur?: sudden in onset How Severe Is Your Hair Loss?: severe medial left breast, there is diffuse skin  thickening which is not significantly changed. This is likely the sequela of  radiation therapy. 3. No mammographic evidence of malignancy bilaterally.     ROS    Physical Exam    ASSESSMENT and PLAN  {ASSESSMENT/PLAN:44195} What Hair Products Do You Use?: 2-n-1 shampoo

## 2018-06-15 RX ORDER — ANASTROZOLE 1 MG/1
1 TABLET ORAL DAILY
Qty: 90 TAB | Refills: 3 | Status: SHIPPED | OUTPATIENT
Start: 2018-06-15 | End: 2019-05-24 | Stop reason: SDUPTHER

## 2018-06-15 NOTE — TELEPHONE ENCOUNTER
Patient stated she sent the requesting to [facility] and has not heard anything back yet. Patient is not out of medication.

## 2019-05-24 ENCOUNTER — TELEPHONE (OUTPATIENT)
Dept: INFUSION THERAPY | Age: 59
End: 2019-05-24

## 2019-05-24 DIAGNOSIS — Z17.0 MALIGNANT NEOPLASM OF UPPER-OUTER QUADRANT OF LEFT BREAST IN FEMALE, ESTROGEN RECEPTOR POSITIVE (HCC): Primary | ICD-10-CM

## 2019-05-24 DIAGNOSIS — C50.412 MALIGNANT NEOPLASM OF UPPER-OUTER QUADRANT OF LEFT BREAST IN FEMALE, ESTROGEN RECEPTOR POSITIVE (HCC): Primary | ICD-10-CM

## 2019-05-24 RX ORDER — ANASTROZOLE 1 MG/1
1 TABLET ORAL DAILY
Qty: 30 TAB | Refills: 0 | Status: SHIPPED | OUTPATIENT
Start: 2019-05-24 | End: 2019-06-07 | Stop reason: SDUPTHER

## 2019-05-24 NOTE — TELEPHONE ENCOUNTER
Requested Prescriptions     Pending Prescriptions Disp Refills    anastrozole (ARIMIDEX) 1 mg tablet 90 Tab 3     Sig: Take 1 mg by mouth daily. Indications: Hormone Receptor Positive Breast Cancer     Last office visit here 4/27/17. Recommendation was to RTC in 3 months. To provider for review.

## 2019-05-24 NOTE — TELEPHONE ENCOUNTER
Will refill for 30 tabs, NR. Please advise patient that she needs an office visit with Dr. Ashley Even as she has not been here since 2017 and was supposed to have 3 month follow up.

## 2019-06-07 ENCOUNTER — OFFICE VISIT (OUTPATIENT)
Dept: ONCOLOGY | Age: 59
End: 2019-06-07

## 2019-06-07 VITALS
RESPIRATION RATE: 19 BRPM | SYSTOLIC BLOOD PRESSURE: 110 MMHG | TEMPERATURE: 98.5 F | HEIGHT: 68 IN | DIASTOLIC BLOOD PRESSURE: 68 MMHG | BODY MASS INDEX: 37.89 KG/M2 | WEIGHT: 250 LBS | OXYGEN SATURATION: 97 % | HEART RATE: 85 BPM

## 2019-06-07 DIAGNOSIS — Z17.0 MALIGNANT NEOPLASM OF UPPER-OUTER QUADRANT OF LEFT BREAST IN FEMALE, ESTROGEN RECEPTOR POSITIVE (HCC): Primary | ICD-10-CM

## 2019-06-07 DIAGNOSIS — Z79.811 AROMATASE INHIBITOR USE: ICD-10-CM

## 2019-06-07 DIAGNOSIS — R23.2 HOT FLASHES: ICD-10-CM

## 2019-06-07 DIAGNOSIS — M85.80 OSTEOPENIA, UNSPECIFIED LOCATION: ICD-10-CM

## 2019-06-07 DIAGNOSIS — G47.30 SLEEP APNEA, UNSPECIFIED TYPE: ICD-10-CM

## 2019-06-07 DIAGNOSIS — M25.60 JOINT STIFFNESS: ICD-10-CM

## 2019-06-07 DIAGNOSIS — Z12.31 SCREENING MAMMOGRAM, ENCOUNTER FOR: ICD-10-CM

## 2019-06-07 DIAGNOSIS — F32.A DEPRESSION, UNSPECIFIED DEPRESSION TYPE: ICD-10-CM

## 2019-06-07 DIAGNOSIS — E66.01 SEVERE OBESITY (HCC): ICD-10-CM

## 2019-06-07 DIAGNOSIS — Z78.0 POSTMENOPAUSAL: ICD-10-CM

## 2019-06-07 DIAGNOSIS — Z91.89 AT RISK FOR OSTEOPOROSIS: ICD-10-CM

## 2019-06-07 DIAGNOSIS — C50.412 MALIGNANT NEOPLASM OF UPPER-OUTER QUADRANT OF LEFT BREAST IN FEMALE, ESTROGEN RECEPTOR POSITIVE (HCC): Primary | ICD-10-CM

## 2019-06-07 DIAGNOSIS — Z98.890 HISTORY OF LUMPECTOMY OF LEFT BREAST: ICD-10-CM

## 2019-06-07 RX ORDER — ANASTROZOLE 1 MG/1
1 TABLET ORAL DAILY
Qty: 30 TAB | Refills: 5 | Status: SHIPPED | OUTPATIENT
Start: 2019-06-07 | End: 2019-08-27 | Stop reason: SDUPTHER

## 2019-06-07 RX ORDER — VITAMIN E 1000 UNIT
CAPSULE ORAL
COMMUNITY

## 2019-06-07 RX ORDER — VENLAFAXINE HYDROCHLORIDE 150 MG/1
CAPSULE, EXTENDED RELEASE ORAL
COMMUNITY
Start: 2019-04-03

## 2019-06-07 NOTE — LETTER
6/7/19 Patient: Jamila Toledo YOB: 1960 Date of Visit: 6/7/2019 [address], [place] 
[address] [place] [address] [address] VIA Facsimile: [phone] [address] Nolasco MD, Thank you for referring Ms. Pat Blount to [address] [place] [address] for evaluation. My notes for this consultation are attached. If you have questions, please do not hesitate to call me. I look forward to following your patient along with you. Sincerely, Shahrzad Vora, DO

## 2019-06-07 NOTE — PROGRESS NOTES
HEME/ONC PROGRESS NOTE       Jennifer Vail is a 59 y.o. 1960 female and presents with Breast Cancer    CC: Breast Cancer 12/15    HPI:  Patient seen today for office follow up of breast cancer on adjuvant hormonal therapy with Arimidex. She has not been here since 2017 per patient preference. She is tolerating Arimidex well overall. She has some joint stiffness that is tolerable per patient. She reports that she has occasional hot flashes which are also tolerable. She reports that her appetite is good and her energy levels are okay. She has some mild fatigue occasionally. Last mammo 10/17 stable. Patient reports that she was busy with life/moving/changing jobs, etc and will get mammogram soon. Labs stable with PCP earlier this year per patient. She is here alone today. DX:  Encounter Diagnoses   Name Primary?     Severe obesity (HCC)     Malignant neoplasm of upper-outer quadrant of left breast in female, estrogen receptor positive ([facility] [address] Yes    Aromatase inhibitor use     History of lumpectomy of left breast     Depression, unspecified depression type     Sleep apnea, unspecified type     Postmenopausal     Osteopenia, unspecified location     At risk for osteoporosis     Hot flashes     Joint stiffness     Screening mammogram, encounter for       STAGE: T2N0 ER+ HER2 neg mammoprint low risk    TREATMENT COURSE: Lumpectomy, radiation, and then AI    Past Medical History:   Diagnosis Date    Breast cancer ([facility] 12/05/2015    IDC, DCIS    Cancer ([facility] [address]     LEFT BREAST    Fibrocystic breast disease     Psychiatric disorder 12/29/15    DEPRESSION    PUD (peptic ulcer disease)     OCCASIONAL DISCOMFORT-OCC TAKES ZANTAC    S/P radiation therapy 2016    left    Sleep apnea     USES CPAP     Past Surgical History:   Procedure Laterality Date    BREAST SURGERY PROCEDURE UNLISTED  1981    BREAST BIOPSY-CYST-LEFT    HX APPENDECTOMY  1973    HX BREAST BIOPSY Left     22 yo - benign    HX BREAST LUMPECTOMY Left 2016    LEFT BREAST LUMPECTOMY WITH SENTINEL NODE BIOPSY WITH ULTRASOUND performed by Major Morales MD at [address] HX COLONOSCOPY  X2    LATEST-    HX WISDOM TEETH EXTRACTION  1983     Social History     Socioeconomic History    Marital status:      Spouse name: Not on file    Number of children: Not on file    Years of education: Not on file    Highest education level: Not on file   Tobacco Use    Smoking status: Former Smoker     Packs/day: 0.25     Years: 30.00     Pack years: 7.50     Last attempt to quit: 2014     Years since quittin.4    Smokeless tobacco: Never Used    Tobacco comment: HAS QUIT NUMEROUS TIMES   Substance and Sexual Activity    Alcohol use: Yes     Alcohol/week: 2.4 oz     Types: 2 Glasses of wine, 2 Standard drinks or equivalent per week    Drug use: No     Comment: BEFORE AGE 23, USED HASH       Family History   Problem Relation Age of Onset    Cancer Mother         breast cancer & pancreatic cancer    Breast Cancer Mother 54    Heart Disease Father     Cancer Maternal Grandmother         breast cancer    Breast Cancer Maternal Grandmother     Other Brother         leukemia     Other Brother         hepatitis C    Diabetes Sister     Diabetes Brother     Anesth Problems Neg Hx        Current Outpatient Medications   Medication Sig Dispense Refill    venlafaxine-SR (EFFEXOR-XR) 150 mg capsule       ascorbic acid, vitamin C, (VITAMIN C) 1,000 mg tablet Take  by mouth.  anastrozole (ARIMIDEX) 1 mg tablet Take 1 mg by mouth daily. Indications: Hormone Receptor Positive Breast Cancer 30 Tab 5    DOCUSATE CALCIUM (STOOL SOFTENER PO) Take  by mouth.  aspirin delayed-release 81 mg tablet Take  by mouth daily.  cholecalciferol (VITAMIN D3) 1,000 unit tablet Take 2,000 Units by mouth daily.  CALCIUM CARBONATE (CALCIUM 600 PO) Take 1,200 mg by mouth daily.       multivitamin (ONE A DAY) tablet Take 1 Tab by mouth daily.  VITAMIN E ACETATE (VITAMIN E PO) Take 1,000 Units by mouth daily. Allergies   Allergen Reactions    Pcn [Penicillins] Other (comments)     \"convulsions\"    Sulfa (Sulfonamide Antibiotics) Hives     Review of Systems    A comprehensive review of systems was negative except for: per HPI     Objective:  Visit Vitals  /68 (BP 1 Location: Right arm, BP Patient Position: Sitting)   Pulse 85   Temp 98.5 °F (36.9 °C) (Oral)   Resp 19   Ht 5' 8\" (1.727 m)   Wt 250 lb (113.4 kg)   SpO2 97%   BMI 38.01 kg/m²     Physical Exam:   General appearance - Alert, well appearing, and in no distress, oriented to person, place, and time and overweight  Mental status - Alert, oriented to person, place, and time, normal mood, behavior, speech, dress, motor activity, and thought processes  EYE - Conj clear  Mouth - Mucous membranes moist  Neck - Supple  Chest - Clear to auscultation  Heart - Normal rate and regular rhythm   Abdomen - Soft, nontender  Ext - No pedal edema noted  Skin - Warm and dry. Neuro - Alert, oriented, normal speech, no focal findings or movement disorder noted  Breast - No masses palpable on right, left breast diffusely pink, warm, ? Seroma at lateral scar line    Diagnostic Imaging Reviewed    [facility] Results (most recent):  Results from Hospital Encounter encounter on 10/23/17   [facility] MAMMO BI DX INCL CAD    Narrative STUDY:  Bilateral Diagnostic Digital Mammography in addition to left breast  ultrasound    INDICATION:  Left lumpectomy for carcinoma in 2016, also with medial left breast  pain    COMPARISON:  Multiple prior studies, dating back to 2003    BREAST COMPOSITION: The breasts are heterogeneously dense, which may obscure  small masses. FINDINGS: Bilateral digital diagnostic mammography was performed, and is  interpreted in conjunction with a computer assisted detection (CAD) system.   Lumpectomy changes are noted in the left breast. Magnification views show  evolving post lumpectomy changes within the left axillary tail, with no  suspicious microcalcifications. Left breast skin thickening is unchanged, and  likely sequela of radiation therapy. There are no suspicious findings within the  region of pain in the medial left breast. No new masses or suspicious  calcifications are identified within the right breast.     Left breast ultrasound was performed of the region of pain, the 8:00 location, 3  cm from the nipple. There is diffuse skin thickening but no suspicious  underlying lesions within the breasts. There are no focal fluid collections. Impression IMPRESSION:    1. BI-RADS Assessment Category 2: Benign finding. Postlumpectomy changes within  left axillary tail, evolving in a benign fashion. No mammographic evidence of  recurrent malignancy. 2. In the region of focal pain in the medial left breast, there is diffuse skin  thickening which is not significantly changed. This is likely the sequela of  radiation therapy. 3. No mammographic evidence of malignancy bilaterally. Recommend annual follow-up diagnostic mammography per lumpectomy protocol. The patient has been notified of these results and recommendations. Lab Results Reviewed  Lab Results   Component Value Date/Time    WBC 7.4 12/29/2015 04:04 PM    HGB 12.9 12/29/2015 04:04 PM    HCT 37.5 12/29/2015 04:04 PM    PLATELET 147 41/81/7881 04:04 PM    MCV 92.4 12/29/2015 04:04 PM       Lab Results   Component Value Date/Time    Sodium 140 12/29/2015 04:04 PM    Potassium 4.2 12/29/2015 04:04 PM    Chloride 104 12/29/2015 04:04 PM    CO2 29 12/29/2015 04:04 PM    Anion gap 7 12/29/2015 04:04 PM    Glucose 84 12/29/2015 04:04 PM    BUN 14 12/29/2015 04:04 PM    Creatinine 0.82 12/29/2015 04:04 PM    BUN/Creatinine ratio 17 12/29/2015 04:04 PM    GFR est AA >60 12/29/2015 04:04 PM    GFR est non-AA >60 12/29/2015 04:04 PM    Calcium 9.2 12/29/2015 04:04 PM     Assessment/Plan:     1. T2N0 Er/Pr+ Her 2 nadine negative s/p lumpectomy/ mammoprint low risk luminal A   Patient completed radiation. No evidence of recurrent cancer. Patient is on adjuvant hormonal therapy and tolerating this fine per patient. Continue Arimidex. Refilled today. Mammo 10/17 stable. Patient has not had a mammogram recently. Ordered today. She reports that she will try to get mammo done today while she is here. Routine labs per PCP. Labs earlier this year with PCP and labs good per patient. 2.  Family hx of Breast Cancer/Pancreatic cancer in mom. BRCA inconclusive     3. Sleep Apnea/Depression/Obesity. On Effexor. Management per PCP. 4.  Joint Stiffness. Likely due to AI. Tolerable per patient. Will continue to monitor. 5.  Hot Flashes. Likely due to AI. Tolerable per patient. Will continue to monitor. 6.  Bone Health. Last DEXA 2016 with Osteopenia. Taking Calcium and Vitamin D. Will order follow up DEXA today. Call if questions. F/u here in 6 months. This patient was seen in conjunction with Kingsley Draper NP.     Signed: Lan Segundo DO

## 2019-06-13 ENCOUNTER — HOSPITAL ENCOUNTER (OUTPATIENT)
Dept: MAMMOGRAPHY | Age: 59
Discharge: HOME | End: 2019-06-13
Attending: INTERNAL MEDICINE
Payer: COMMERCIAL

## 2019-06-13 DIAGNOSIS — Z79.811 AROMATASE INHIBITOR USE: ICD-10-CM

## 2019-06-13 DIAGNOSIS — Z12.31 SCREENING MAMMOGRAM, ENCOUNTER FOR: ICD-10-CM

## 2019-06-13 DIAGNOSIS — C50.412 MALIGNANT NEOPLASM OF UPPER-OUTER QUADRANT OF LEFT BREAST IN FEMALE, ESTROGEN RECEPTOR POSITIVE (HCC): ICD-10-CM

## 2019-06-13 DIAGNOSIS — Z17.0 MALIGNANT NEOPLASM OF UPPER-OUTER QUADRANT OF LEFT BREAST IN FEMALE, ESTROGEN RECEPTOR POSITIVE (HCC): ICD-10-CM

## 2019-06-13 DIAGNOSIS — Z98.890 HISTORY OF LUMPECTOMY OF LEFT BREAST: ICD-10-CM

## 2019-06-13 PROCEDURE — 77062 BREAST TOMOSYNTHESIS BI: CPT

## 2019-07-10 ENCOUNTER — HOSPITAL ENCOUNTER (OUTPATIENT)
Dept: MAMMOGRAPHY | Age: 59
Discharge: HOME | End: 2019-07-10
Attending: NURSE PRACTITIONER
Payer: COMMERCIAL

## 2019-07-10 DIAGNOSIS — Z78.0 POSTMENOPAUSAL: ICD-10-CM

## 2019-07-10 DIAGNOSIS — Z79.811 AROMATASE INHIBITOR USE: ICD-10-CM

## 2019-07-10 DIAGNOSIS — M85.80 OSTEOPENIA, UNSPECIFIED LOCATION: ICD-10-CM

## 2019-07-10 DIAGNOSIS — Z17.0 MALIGNANT NEOPLASM OF UPPER-OUTER QUADRANT OF LEFT BREAST IN FEMALE, ESTROGEN RECEPTOR POSITIVE (HCC): ICD-10-CM

## 2019-07-10 DIAGNOSIS — C50.412 MALIGNANT NEOPLASM OF UPPER-OUTER QUADRANT OF LEFT BREAST IN FEMALE, ESTROGEN RECEPTOR POSITIVE (HCC): ICD-10-CM

## 2019-07-10 DIAGNOSIS — Z91.89 AT RISK FOR OSTEOPOROSIS: ICD-10-CM

## 2019-07-10 PROCEDURE — 77080 DXA BONE DENSITY AXIAL: CPT

## 2019-07-11 NOTE — PROGRESS NOTES
Please let patient know that DEXA is stable/still shows osteopenia. Please advise patient to continue taking Calcium and Vitamin D and do weight bearing exercises.

## 2019-07-12 ENCOUNTER — TELEPHONE (OUTPATIENT)
Dept: ONCOLOGY | Age: 59
End: 2019-07-12

## 2019-07-12 NOTE — TELEPHONE ENCOUNTER
Patient called and stated that she was returning a call. Patient called and stated that she can not have her phone at work and can be reached after 3 on Monday.   # [phone]

## 2019-07-15 NOTE — PROGRESS NOTES
Patient returned call. HIPAA verified. Results reviewed with patient who verbalized understanding. Will continue Calcium, Vitamin D, and exercising per instruction.

## 2019-12-16 ENCOUNTER — OFFICE VISIT (OUTPATIENT)
Dept: ONCOLOGY | Age: 59
End: 2019-12-16

## 2019-12-16 VITALS
TEMPERATURE: 97.9 F | DIASTOLIC BLOOD PRESSURE: 72 MMHG | BODY MASS INDEX: 37.78 KG/M2 | SYSTOLIC BLOOD PRESSURE: 114 MMHG | HEIGHT: 68 IN | HEART RATE: 84 BPM | OXYGEN SATURATION: 97 % | WEIGHT: 249.3 LBS

## 2019-12-16 DIAGNOSIS — E66.01 SEVERE OBESITY (HCC): ICD-10-CM

## 2019-12-16 DIAGNOSIS — Z79.811 AROMATASE INHIBITOR USE: ICD-10-CM

## 2019-12-16 DIAGNOSIS — M25.60 JOINT STIFFNESS: ICD-10-CM

## 2019-12-16 DIAGNOSIS — F32.A DEPRESSION, UNSPECIFIED DEPRESSION TYPE: ICD-10-CM

## 2019-12-16 DIAGNOSIS — Z17.0 MALIGNANT NEOPLASM OF UPPER-OUTER QUADRANT OF LEFT BREAST IN FEMALE, ESTROGEN RECEPTOR POSITIVE (HCC): Primary | ICD-10-CM

## 2019-12-16 DIAGNOSIS — C50.412 MALIGNANT NEOPLASM OF UPPER-OUTER QUADRANT OF LEFT BREAST IN FEMALE, ESTROGEN RECEPTOR POSITIVE (HCC): Primary | ICD-10-CM

## 2019-12-16 DIAGNOSIS — R23.2 HOT FLASHES: ICD-10-CM

## 2019-12-16 DIAGNOSIS — Z98.890 HISTORY OF LUMPECTOMY OF LEFT BREAST: ICD-10-CM

## 2019-12-16 DIAGNOSIS — M85.80 OSTEOPENIA, UNSPECIFIED LOCATION: ICD-10-CM

## 2019-12-16 RX ORDER — ANASTROZOLE 1 MG/1
1 TABLET ORAL DAILY
Qty: 90 TAB | Refills: 3 | Status: SHIPPED | OUTPATIENT
Start: 2019-12-16 | End: 2020-02-12 | Stop reason: SDUPTHER

## 2019-12-16 NOTE — LETTER
12/16/19 Patient: Sheri Mtz YOB: 1960 Date of Visit: 12/16/2019 244 Irene Nolasco MD 
[address] [address] VIA Facsimile: [phone] Dear 244 Irene Nolasco MD, Thank you for referring Ms. Pop Patel to [address] [place] [address] for evaluation. My notes for this consultation are attached. If you have questions, please do not hesitate to call me. I look forward to following your patient along with you. Sincerely, Erin Silva, DO

## 2019-12-16 NOTE — PROGRESS NOTES
Garo Kaye is a 62 y.o. female  Chief Complaint   Patient presents with    Follow-up    Breast Cancer     1. Have you been to the ER, urgent care clinic since your last visit? Hospitalized since your last visit? No.    2. Have you seen or consulted any other health care providers outside of the [address] [place] [address] since your last visit? Include any pap smears or colon screening.  No.

## 2019-12-16 NOTE — PROGRESS NOTES
HEME/ONC PROGRESS NOTE       Fortunato Mckinnon is a 62 y.o. 1960 female and presents with Follow-up and Breast Cancer    CC: Breast Cancer 12/15    HPI:  Patient seen today for office follow up of breast cancer on adjuvant hormonal therapy with Arimidex. Doing well overall. Tolerating AI fine. Mammo good 6/19. Labs with PCP. Has carpal tunnel. No new issues. Last visit: She has not been here since 2017 per patient preference. She is tolerating Arimidex well overall. She has some joint stiffness that is tolerable per patient. She reports that she has occasional hot flashes which are also tolerable. She reports that her appetite is good and her energy levels are okay. She has some mild fatigue occasionally. Last mammo 10/17 stable. Patient reports that she was busy with life/moving/changing jobs, etc and will get mammogram soon. Labs stable with PCP earlier this year per patient. She is here alone today. DX:  Encounter Diagnoses   Name Primary?     Malignant neoplasm of upper-outer quadrant of left breast in female, estrogen receptor positive ([facility] [address] Yes    Aromatase inhibitor use     Severe obesity ([address]     History of lumpectomy of left breast     Depression, unspecified depression type     Osteopenia, unspecified location     Hot flashes     Joint stiffness         STAGE: T2N0 ER+ HER2 neg mammoprint low risk    TREATMENT COURSE: Lumpectomy, radiation, and then AI    Past Medical History:   Diagnosis Date    Breast cancer ([facility] [address] 12/05/2015    IDC, DCIS    Cancer ([facility] [address]     LEFT BREAST    Fibrocystic breast disease     Psychiatric disorder 12/29/15    DEPRESSION    PUD (peptic ulcer disease)     OCCASIONAL DISCOMFORT-OCC TAKES ZANTAC    S/P radiation therapy 2016    left    Sleep apnea     USES CPAP     Past Surgical History:   Procedure Laterality Date    BREAST SURGERY PROCEDURE UNLISTED  1981    BREAST BIOPSY-CYST-LEFT    HX APPENDECTOMY  1973    HX BREAST BIOPSY Left 22 yo - benign    HX BREAST LUMPECTOMY Left 2016    LEFT BREAST LUMPECTOMY WITH SENTINEL NODE BIOPSY WITH ULTRASOUND performed by M[facility]za Robledo [facility] at [facility]    HX COLONOSCOPY  X2    LATEST-    HX WISDOM TEETH EXTRACTION  1983     Social History     Socioeconomic History    Marital status:      Spouse name: Not on file    Number of children: Not on file    Years of education: Not on file    Highest education level: Not on file   Tobacco Use    Smoking status: Former Smoker     Packs/day: 0.25     Years: 30.00     Pack years: 7.50     Last attempt to quit: 2014     Years since quittin.9    Smokeless tobacco: Never Used    Tobacco comment: HAS QUIT NUMEROUS TIMES   Substance and Sexual Activity    Alcohol use: Yes     Alcohol/week: 4.0 standard drinks     Types: 2 Glasses of wine, 2 Standard drinks or equivalent per week    Drug use: No     Comment: BEFORE AGE 23, USED HASH       Family History   Problem Relation Age of Onset    Cancer Mother         breast cancer & pancreatic cancer    Breast Cancer Mother 54    Heart Disease Father     Cancer Maternal Grandmother         breast cancer    Breast Cancer Maternal Grandmother     Other Brother         leukemia     Other Brother         hepatitis C    Diabetes Sister     Diabetes Brother     Anesth Problems Neg Hx        Current Outpatient Medications   Medication Sig Dispense Refill    anastrozole (ARIMIDEX) 1 mg tablet Take 1 mg by mouth daily. Indications: Hormone Receptor Positive Breast Cancer 30 Tab 5    venlafaxine-SR (EFFEXOR-XR) 150 mg capsule       ascorbic acid, vitamin C, (VITAMIN C) 1,000 mg tablet Take  by mouth.  DOCUSATE CALCIUM (STOOL SOFTENER PO) Take  by mouth.  aspirin delayed-release 81 mg tablet Take  by mouth daily.  cholecalciferol (VITAMIN D3) 1,000 unit tablet Take 2,000 Units by mouth daily.  CALCIUM CARBONATE (CALCIUM 600 PO) Take 1,200 mg by mouth daily.       multivitamin (ONE A DAY) tablet Take 1 Tab by mouth daily.  VITAMIN E ACETATE (VITAMIN E PO) Take 1,000 Units by mouth daily. Allergies   Allergen Reactions    Pcn [Penicillins] Other (comments)     \"convulsions\"    Sulfa (Sulfonamide Antibiotics) Hives     Review of Systems    A comprehensive review of systems was negative except for: per HPI     Objective:  Visit Vitals  /72 (BP 1 Location: Left arm, BP Patient Position: Sitting)   Pulse 84   Temp 97.9 °F (36.6 °C) (Oral)   Ht 5' 8\" (1.727 m)   Wt 249 lb 4.8 oz (113.1 kg)   SpO2 97%   BMI 37.91 kg/m²     Physical Exam:   General appearance - Alert, well appearing, and in no distress, oriented to person, place, and time and overweight  Mental status - Alert, oriented to person, place, and time, normal mood, behavior, speech, dress, motor activity, and thought processes  EYE - Conj clear  Mouth - Mucous membranes moist  Neck - Supple  Chest - Clear to auscultation  Heart - Normal rate and regular rhythm   Abdomen - Soft, nontender  Ext - No pedal edema noted  Skin - Warm and dry. Neuro - Alert, oriented, normal speech, no focal findings or movement disorder noted  Breast - No masses palpable     Diagnostic Imaging Reviewed    [facility] Results (most recent):  Results from Hospital Encounter encounter on 06/13/19   [facility] 3D ARTURO W MAMMO BI DX INCL CAD    Narrative INDICATION: Diagnostic follow-up for breast conserving therapy on the left in  January 2016. Tyra Hind EXAM: Bilateral Digital diagnostic Mammography. COMPARISON: Prior studies dating back to 2013, most recently 10/23/2017 . PROCEDURE: Digital tomography was performed bilaterally. FINDINGS:   BREAST COMPOSITION: The breast tissue is heterogeneously dense, which could  obscure detection of small masses (approximately 51-75% glandular).     Postoperative changes in the deep left axillary breast are stable as visualized,  although the entire operative bed is difficult to include due to its depth.  There is otherwise mild stable asymmetry. Numerous calcifications are felt to be  stable. There are no new dominant masses, clustered calcifications, skin changes  or nipple changes which suggest malignancy. Impression IMPRESSION:  1. BIRADS ASSESSMENT CATEGORY 2, benign. 2.  Followup diagnostic mammography is recommended for a total of 5 years  postoperatively. .  3.  These findings have been relayed to the patient. .         Lab Results Reviewed  Lab Results   Component Value Date/Time    WBC 7.4 12/29/2015 04:04 PM    HGB 12.9 12/29/2015 04:04 PM    HCT 37.5 12/29/2015 04:04 PM    PLATELET 781 12/69/9362 04:04 PM    MCV 92.4 12/29/2015 04:04 PM       Lab Results   Component Value Date/Time    Sodium 140 12/29/2015 04:04 PM    Potassium 4.2 12/29/2015 04:04 PM    Chloride 104 12/29/2015 04:04 PM    CO2 29 12/29/2015 04:04 PM    Anion gap 7 12/29/2015 04:04 PM    Glucose 84 12/29/2015 04:04 PM    BUN 14 12/29/2015 04:04 PM    Creatinine 0.82 12/29/2015 04:04 PM    BUN/Creatinine ratio 17 12/29/2015 04:04 PM    GFR est AA >60 12/29/2015 04:04 PM    GFR est non-AA >60 12/29/2015 04:04 PM    Calcium 9.2 12/29/2015 04:04 PM     Assessment/Plan:     1. T2N0 Er/Pr+ Her 2 nadine negative hx of lumpectomy and radiation / mammoprint low risk luminal A   Patient is on adjuvant hormonal therapy and tolerating this fine per patient. Continue Arimidex for total 5 years. Mammo 6/19 stable. Yearly. Routine labs per PCP. 2.  Family hx of Breast Cancer/Pancreatic cancer in mom. BRCA inconclusive     3. Sleep Apnea/Depression/Obesity. On Effexor. Management per PCP. 4.  Joint Stiffness. Likely due to AI. Tolerable per patient. Will continue to monitor. 5.  Hot Flashes. Likely due to AI. Tolerable per patient. Will continue to monitor. 6.  Osteopenia. Taking Calcium and Vitamin D.     7.  Psychosocial.  Mood good. Coping well. Call if questions. F/u here in 6 months.     Signed: Karin Fowler [place]

## 2020-02-12 DIAGNOSIS — C50.412 MALIGNANT NEOPLASM OF UPPER-OUTER QUADRANT OF LEFT BREAST IN FEMALE, ESTROGEN RECEPTOR POSITIVE (HCC): ICD-10-CM

## 2020-02-12 DIAGNOSIS — Z17.0 MALIGNANT NEOPLASM OF UPPER-OUTER QUADRANT OF LEFT BREAST IN FEMALE, ESTROGEN RECEPTOR POSITIVE (HCC): ICD-10-CM

## 2020-02-12 DIAGNOSIS — Z79.811 AROMATASE INHIBITOR USE: ICD-10-CM

## 2020-02-12 RX ORDER — ANASTROZOLE 1 MG/1
TABLET ORAL
Qty: 30 TAB | Refills: 5 | Status: SHIPPED | OUTPATIENT
Start: 2020-02-12 | End: 2021-03-09 | Stop reason: SDUPTHER

## 2020-06-12 ENCOUNTER — TELEPHONE (OUTPATIENT)
Dept: ONCOLOGY | Age: 60
End: 2020-06-12

## 2020-06-12 NOTE — TELEPHONE ENCOUNTER
Call to patient, left VM to call MD office regarding changing next week's appt to Virtual Visit. Contact information supplied.

## 2020-06-18 ENCOUNTER — DOCUMENTATION ONLY (OUTPATIENT)
Dept: ONCOLOGY | Age: 60
End: 2020-06-18

## 2021-03-03 ENCOUNTER — TELEPHONE (OUTPATIENT)
Dept: ONCOLOGY | Age: 61
End: 2021-03-03

## 2021-03-03 NOTE — TELEPHONE ENCOUNTER
Patient called and left a voicemail asking if she needed to get a refill on anastrozole because she is at her 5 year scarlet.     Call back [phone]

## 2021-03-03 NOTE — TELEPHONE ENCOUNTER
Called patient and left a voicemail stating that she needed a follow up due to not seeing her in a while in order to refill her medication.

## 2021-03-03 NOTE — TELEPHONE ENCOUNTER
Patient last seen in 12/19 and needs follow up with Dr. Primo Jasmine. She can stop Anastrozole once completed 5 years of therapy. It does not appear that she has had a mammogram since 6/19. Please contact patient to schedule follow up with Dr. Primo Jasmine - can be VV or OV.

## 2021-03-09 ENCOUNTER — OFFICE VISIT (OUTPATIENT)
Dept: ONCOLOGY | Age: 61
End: 2021-03-09
Payer: COMMERCIAL

## 2021-03-09 VITALS
WEIGHT: 261.9 LBS | DIASTOLIC BLOOD PRESSURE: 82 MMHG | HEART RATE: 79 BPM | BODY MASS INDEX: 39.82 KG/M2 | TEMPERATURE: 96.1 F | SYSTOLIC BLOOD PRESSURE: 137 MMHG | OXYGEN SATURATION: 98 %

## 2021-03-09 DIAGNOSIS — M85.80 OSTEOPENIA, UNSPECIFIED LOCATION: ICD-10-CM

## 2021-03-09 DIAGNOSIS — Z91.89 AT RISK FOR OSTEOPOROSIS: ICD-10-CM

## 2021-03-09 DIAGNOSIS — Z12.31 ENCOUNTER FOR SCREENING MAMMOGRAM FOR BREAST CANCER: ICD-10-CM

## 2021-03-09 DIAGNOSIS — Z98.890 HISTORY OF LUMPECTOMY OF LEFT BREAST: ICD-10-CM

## 2021-03-09 DIAGNOSIS — M25.60 JOINT STIFFNESS: ICD-10-CM

## 2021-03-09 DIAGNOSIS — Z85.3 ENCOUNTER FOR FOLLOW-UP SURVEILLANCE OF BREAST CANCER: ICD-10-CM

## 2021-03-09 DIAGNOSIS — Z80.3 FAMILY HISTORY OF BREAST CANCER IN MOTHER: ICD-10-CM

## 2021-03-09 DIAGNOSIS — F32.A DEPRESSION, UNSPECIFIED DEPRESSION TYPE: ICD-10-CM

## 2021-03-09 DIAGNOSIS — Z08 ENCOUNTER FOR FOLLOW-UP SURVEILLANCE OF BREAST CANCER: ICD-10-CM

## 2021-03-09 DIAGNOSIS — Z79.811 AROMATASE INHIBITOR USE: ICD-10-CM

## 2021-03-09 DIAGNOSIS — G47.30 SLEEP APNEA, UNSPECIFIED TYPE: ICD-10-CM

## 2021-03-09 DIAGNOSIS — Z17.0 MALIGNANT NEOPLASM OF UPPER-OUTER QUADRANT OF LEFT BREAST IN FEMALE, ESTROGEN RECEPTOR POSITIVE (HCC): Primary | ICD-10-CM

## 2021-03-09 DIAGNOSIS — Z78.0 POSTMENOPAUSAL: ICD-10-CM

## 2021-03-09 DIAGNOSIS — C50.412 MALIGNANT NEOPLASM OF UPPER-OUTER QUADRANT OF LEFT BREAST IN FEMALE, ESTROGEN RECEPTOR POSITIVE (HCC): Primary | ICD-10-CM

## 2021-03-09 PROCEDURE — 99214 OFFICE O/P EST MOD 30 MIN: CPT | Performed by: INTERNAL MEDICINE

## 2021-03-09 RX ORDER — ANASTROZOLE 1 MG/1
TABLET ORAL
Qty: 90 TAB | Refills: 0 | Status: SHIPPED | OUTPATIENT
Start: 2021-03-09

## 2021-03-09 NOTE — PROGRESS NOTES
Cancer [place] at [facility]  [address], [place]: [phone]  F: [address] Al[address]so Perez Chaparrita Jo is a 56 y.o. 1960 female and presents with Follow-up and Breast Cancer    CC: Breast Cancer Dx in 12/15    STAGE: T2N0 ER+ HER2 neg mammoprint low risk    TREATMENT COURSE: Lumpectomy, radiation, and then adjuvant hormonal therapy with Anastrozole x 5 years (will be done 5/21)    HPI:  Robert Delgado is a 56 y.o. female seen today in office during [place] pandemic per patient preference for follow up of breast cancer. Last seen here in 12/19. She will complete 5 years of adjuvant hormonal therapy with Anastrozole in 5/21 - almost done. She reports that she feels well overall today. She is tolerating Anastrozole well overall with no significant side effects. She has occasional joint stiffness but tolerable. Hot flashes have resolved. Her appetite and energy levels are low overall. She had a bilateral mammogram in 6/19 that was negative/good. She did not have a mammogram in 2020. She has routine HM and labs with her PCP. She denies fever, chills, cough, SOB, CP, nausea, vomiting, diarrhea, and constipation. She denies pain today. She continues to work during [employer] [employer] in [employer]. She is fully vaccinated for COVID-19. She is here alone today. DX:  Encounter Diagnoses   Name Primary?     History of lumpectomy of left breast     Encounter for follow-up surveillance of breast cancer     Depression, unspecified depression type     Osteopenia, unspecified location     Sleep apnea, unspecified type     Postmenopausal     Family history of breast cancer in mother    [facility] Encounter for screening mammogram for breast cancer     Joint stiffness     At risk for osteoporosis     Aromatase inhibitor use     Malignant neoplasm of upper-outer quadrant of left breast in female, estrogen receptor positive ([facility] Yes      Past Medical History:   Diagnosis Date    Breast cancer ([facility] 2015    IDC, DCIS    Cancer ([facility]     LEFT BREAST    Fibrocystic breast disease     Psychiatric disorder 12/29/15    DEPRESSION    PUD (peptic ulcer disease)     OCCASIONAL DISCOMFORT-OCC TAKES ZANTAC    S/P radiation therapy 2016    left    Sleep apnea     USES CPAP     Past Surgical History:   Procedure Laterality Date    HX APPENDECTOMY  1973    HX BREAST BIOPSY Left     20 yo - benign    HX BREAST LUMPECTOMY Left 2016    LEFT BREAST LUMPECTOMY WITH SENTINEL NODE BIOPSY WITH ULTRASOUND performed by Hussain Loera MD at [address] HX COLONOSCOPY  X2    LATEST-    HX WISDOM TEETH EXTRACTION      MD BREAST SURGERY PROCEDURE UNLISTED      BREAST BIOPSY-CYST-LEFT     Social History     Socioeconomic History    Marital status:      Spouse name: Not on file    Number of children: Not on file    Years of education: Not on file    Highest education level: Not on file   Tobacco Use    Smoking status: Former Smoker     Packs/day: 0.25     Years: 30.00     Pack years: 7.50     Quit date: 2014     Years since quittin.1    Smokeless tobacco: Never Used    Tobacco comment: HAS QUIT NUMEROUS TIMES   Substance and Sexual Activity    Alcohol use:  Yes     Alcohol/week: 4.0 standard drinks     Types: 2 Glasses of wine, 2 Standard drinks or equivalent per week    Drug use: No     Comment: BEFORE AGE 23, USED HASH       Family History   Problem Relation Age of Onset    Cancer Mother         breast cancer & pancreatic cancer    Breast Cancer Mother 54    Heart Disease Father     Cancer Maternal Grandmother         breast cancer    Breast Cancer Maternal Grandmother     Other Brother         leukemia     Other Brother         hepatitis C    Diabetes Sister     Diabetes Brother     Anesth Problems Neg Hx      Current Outpatient Medications   Medication Sig Dispense Refill    anastrozole (ARIMIDEX) 1 mg tablet TAKE 1 TABLET DAILY FOR    HORMONE RECEPTOR POSITIVE  BREAST CANCER 90 Tab 0    venlafaxine-SR (EFFEXOR-XR) 150 mg capsule       ascorbic acid, vitamin C, (VITAMIN C) 1,000 mg tablet Take  by mouth.  DOCUSATE CALCIUM (STOOL SOFTENER PO) Take  by mouth.  aspirin delayed-release 81 mg tablet Take  by mouth daily.  cholecalciferol (VITAMIN D3) 1,000 unit tablet Take 2,000 Units by mouth daily.  CALCIUM CARBONATE (CALCIUM 600 PO) Take 1,200 mg by mouth daily.  multivitamin (ONE A DAY) tablet Take 1 Tab by mouth daily.  VITAMIN E ACETATE (VITAMIN E PO) Take 1,000 Units by mouth daily. Allergies   Allergen Reactions    Pcn [Penicillins] Other (comments)     \"convulsions\"    Sulfa (Sulfonamide Antibiotics) Hives     Review of Systems  A complete review of systems was obtained, negative except as described above and as reported on ROS sheet scanned into system. Objective:  Visit Vitals  /82 (BP 1 Location: Left upper arm, BP Patient Position: Sitting)   Pulse 79   Temp (!) 96.1 °F (35.6 °C) (Temporal)   Wt 261 lb 14.4 oz (118.8 kg)   SpO2 98%   BMI 39.82 kg/m²     Physical Exam:   General appearance - Alert, well appearing, and in no distress, oriented to person, place, and time and overweight  Mental status - Alert, oriented to person, place, and time, normal mood, behavior, speech, dress, motor activity, and thought processes  EYE - Conj clear  Mouth - Wearing a mask   Neck - Supple  Chest - Clear to auscultation, normal respiratory effort  Heart - Normal rate and regular rhythm   Abdomen - Soft, nontender  Ext - No pedal edema noted  Skin - Warm and dry.    Neuro - Alert, oriented, normal speech, no focal findings or movement disorder noted  Breast - No masses palpable in bilateral breasts     Diagnostic Imaging Reviewed:    [facility] Results (most recent):  Results from Hospital Encounter encounter on 06/13/19   [facility] 3D ARTURO W MAMMO BI DX INCL CAD    Narrative INDICATION: Diagnostic follow-up for breast conserving therapy on the left in  January 2016. Clare Nance EXAM: Bilateral Digital diagnostic Mammography. COMPARISON: Prior studies dating back to 2013, most recently 10/23/2017 . PROCEDURE: Digital tomography was performed bilaterally. FINDINGS:   BREAST COMPOSITION: The breast tissue is heterogeneously dense, which could  obscure detection of small masses (approximately 51-75% glandular). Postoperative changes in the deep left axillary breast are stable as visualized,  although the entire operative bed is difficult to include due to its depth. There is otherwise mild stable asymmetry. Numerous calcifications are felt to be  stable. There are no new dominant masses, clustered calcifications, skin changes  or nipple changes which suggest malignancy. Impression IMPRESSION:  1. BIRADS ASSESSMENT CATEGORY 2, benign. 2.  Followup diagnostic mammography is recommended for a total of 5 years  postoperatively. .  3.  These findings have been relayed to the patient. .         Lab Results Reviewed:  Lab Results   Component Value Date/Time    WBC 7.4 12/29/2015 04:04 PM    HGB 12.9 12/29/2015 04:04 PM    HCT 37.5 12/29/2015 04:04 PM    PLATELET 864 86/63/4861 04:04 PM    MCV 92.4 12/29/2015 04:04 PM       Lab Results   Component Value Date/Time    Sodium 140 12/29/2015 04:04 PM    Potassium 4.2 12/29/2015 04:04 PM    Chloride 104 12/29/2015 04:04 PM    CO2 29 12/29/2015 04:04 PM    Anion gap 7 12/29/2015 04:04 PM    Glucose 84 12/29/2015 04:04 PM    BUN 14 12/29/2015 04:04 PM    Creatinine 0.82 12/29/2015 04:04 PM    BUN/Creatinine ratio 17 12/29/2015 04:04 PM    GFR est AA >60 12/29/2015 04:04 PM    GFR est non-AA >60 12/29/2015 04:04 PM    Calcium 9.2 12/29/2015 04:04 PM     Assessment/Plan:     1. T2N0 ER/MD+ Her 2 nadine negative Left Breast Cancer with hx of Lumpectomy and Radiation  Mammaprint low risk luminal A. She is here today for office follow up during [place] pandemic. Last seen in 12/19.   Patient will complete 5 years of adjuvant hormonal therapy with Anastrozole in 5/21 - almost done. She had a bilateral mammogram in 6/19 that was negative/good. She did not have a mammogram in 2020. Yearly bilateral mammogram ordered today. She has routine HM and labs with her PCP. Follow up in 12 months. Patient agrees with plan. 2.  Family Hx of Breast Cancer/Pancreatic Cancer in mom  BRCA inconclusive     3. Sleep Apnea/Depression/Obesity  On Effexor. Management per PCP. 4.  Joint Stiffness  Likely due to AI. Tolerable per patient. Will continue to monitor. 5.  Osteopenia  She is taking Calcium and Vitamin D. Last DEXA was stable in 7/19. Follow up DEXA ordered today for 7/21. 6.  Psychosocial  Mood good, coping well. She is still working during [employer]. Works at [employer]. Has gained weight and trying to lose weight. She is here alone today. Call if questions. Follow up here in 12 months. This patient was seen in conjunction with Janusz Pérez NP. I personally performed a face to face diagnostic evaluation on this patient. I personally reviewed the history and performed the key points on the exam.   I personally reviewed all points in the assessment and created treatment plan with the patient. Specifically, pt seen by me  Doing well overall. Ready to stop AI at 5 years. Tolerating AI. Breast exam today on LEFT without masses, some thickening lower inner quad  Due for mammo and pt will try to do today.    Fu yearly      Signed: Danica Dietz DO

## 2021-03-09 NOTE — PROGRESS NOTES
Elisa Murphy is a 56 y.o. female  Chief Complaint   Patient presents with    Follow-up    Breast Cancer     1. Have you been to the ER, urgent care clinic since your last visit? Hospitalized since your last visit? No.    2. Have you seen or consulted any other health care providers outside of the [address] [place] [address] since your last visit? Include any pap smears or colon screening. No.    Patient stated she has gotten both doses of COVID-19 vaccine.

## 2021-03-09 NOTE — LETTER
3/9/2021 Patient: Lyndsey Messina YOB: 1960 Date of Visit: 3/9/2021 [address], [place] 
[address] [place] [address] [address] Via Fax: [phone] Dear Kim Nolasco MD, Thank you for referring Ms. Yan Webster to [address] [place] [address] for evaluation. My notes for this consultation are attached. If you have questions, please do not hesitate to call me. I look forward to following your patient along with you. Sincerely, Isabel Armendariz, DO

## 2021-04-07 ENCOUNTER — HOSPITAL ENCOUNTER (OUTPATIENT)
Dept: MAMMOGRAPHY | Age: 61
Discharge: HOME | End: 2021-04-07
Attending: NURSE PRACTITIONER
Payer: COMMERCIAL

## 2021-04-07 DIAGNOSIS — Z98.890 HISTORY OF LUMPECTOMY OF LEFT BREAST: ICD-10-CM

## 2021-04-07 DIAGNOSIS — Z12.31 ENCOUNTER FOR SCREENING MAMMOGRAM FOR BREAST CANCER: ICD-10-CM

## 2021-04-07 PROCEDURE — 77062 BREAST TOMOSYNTHESIS BI: CPT

## 2021-04-08 PROBLEM — Z12.31 ENCOUNTER FOR SCREENING MAMMOGRAM FOR BREAST CANCER: Status: RESOLVED | Noted: 2021-03-09 | Resolved: 2021-04-08

## 2021-07-12 ENCOUNTER — HOSPITAL ENCOUNTER (OUTPATIENT)
Dept: MAMMOGRAPHY | Age: 61
Discharge: HOME | End: 2021-07-12
Attending: NURSE PRACTITIONER
Payer: COMMERCIAL

## 2021-07-12 DIAGNOSIS — Z91.89 AT RISK FOR OSTEOPOROSIS: ICD-10-CM

## 2021-07-12 DIAGNOSIS — Z78.0 POSTMENOPAUSAL: ICD-10-CM

## 2021-07-12 DIAGNOSIS — M85.80 OSTEOPENIA, UNSPECIFIED LOCATION: ICD-10-CM

## 2021-07-12 DIAGNOSIS — Z98.890 HISTORY OF LUMPECTOMY OF LEFT BREAST: ICD-10-CM

## 2021-07-12 DIAGNOSIS — Z79.811 AROMATASE INHIBITOR USE: ICD-10-CM

## 2021-07-12 PROCEDURE — 77080 DXA BONE DENSITY AXIAL: CPT

## 2021-07-18 NOTE — PROGRESS NOTES
Has osteopenia  Should be done AI  Can take ca with d and weight bearing exercises and fu with PCP for this now

## 2021-07-19 NOTE — PROGRESS NOTES
Attempted to reach patient off mobile phone number listed, no answer. Left a voicemail to give the office a call back.   Evie Jaquez

## 2021-07-21 NOTE — PROGRESS NOTES
ARCHIE Verified. Called patient off mobile phone number listed. Patient was made aware of her Bone Density study report and was given advice and updates per  to help with this. Patient was very appreciative over call!   Josefina Rockwell

## 2022-03-08 ENCOUNTER — TELEPHONE (OUTPATIENT)
Dept: ONCOLOGY | Age: 62
End: 2022-03-08

## 2022-03-08 NOTE — TELEPHONE ENCOUNTER
Returned patient call to [phone], ID verified X 2. Chart reviewed with patient, last OV was 3/9/21 with yearly f/u scheduled. Has OV on 3/9/22. Requested transfer to [facility] for possible reschedule. Update to Provider.

## 2022-03-08 NOTE — TELEPHONE ENCOUNTER
Pt said she received a garbled message about an upcoming appt with Dr Kandis Zepeda and she has not seen Dr Kandis Zepeda in years. She would like a returned call to see where this came from.     CB# is [phone]

## 2022-03-09 ENCOUNTER — OFFICE VISIT (OUTPATIENT)
Dept: ONCOLOGY | Age: 62
End: 2022-03-09
Payer: COMMERCIAL

## 2022-03-09 VITALS
BODY MASS INDEX: 37.38 KG/M2 | HEART RATE: 86 BPM | DIASTOLIC BLOOD PRESSURE: 68 MMHG | HEIGHT: 68 IN | OXYGEN SATURATION: 95 % | TEMPERATURE: 96.9 F | SYSTOLIC BLOOD PRESSURE: 115 MMHG | WEIGHT: 246.6 LBS

## 2022-03-09 DIAGNOSIS — C50.412 MALIGNANT NEOPLASM OF UPPER-OUTER QUADRANT OF LEFT BREAST IN FEMALE, ESTROGEN RECEPTOR POSITIVE (HCC): Primary | ICD-10-CM

## 2022-03-09 DIAGNOSIS — Z98.890 HISTORY OF LUMPECTOMY OF LEFT BREAST: ICD-10-CM

## 2022-03-09 DIAGNOSIS — Z17.0 MALIGNANT NEOPLASM OF UPPER-OUTER QUADRANT OF LEFT BREAST IN FEMALE, ESTROGEN RECEPTOR POSITIVE (HCC): Primary | ICD-10-CM

## 2022-03-09 PROCEDURE — 99213 OFFICE O/P EST LOW 20 MIN: CPT | Performed by: INTERNAL MEDICINE

## 2022-03-09 NOTE — LETTER
3/9/2022    Patient: Wilfred Lorenz   YOB: 1960   Date of Visit: 3/9/2022     Anthony Sarkar MD  [address] [address]  Via Fax: [phone]    Dear Anthony Sarkar MD,      Thank you for referring Ms. Cheo Hernandez to [address] [place] [address] for evaluation. My notes for this consultation are attached. If you have questions, please do not hesitate to call me. I look forward to following your patient along with you.       Sincerely,    Madisyn Conway, DO

## 2022-03-09 NOTE — PROGRESS NOTES
Cancer [place] at [facility]  [address], [place]: [phone]  F: [address] Ewelina Llanes is a 59 y.o. 1960 female and presents with Follow-up and Breast Cancer    CC: Breast Cancer Dx in 12/15    STAGE: T2N0 ER+ HER2 neg mammoprint low risk    TREATMENT COURSE: Lumpectomy, radiation, and then adjuvant hormonal therapy with Anastrozole x 5 years (will be done 5/21)    HPI:  Leandro Vásquez is a 59 y.o. female seen today in office for 1 year follow up of breast cancer. Done adjuvant hormonal therapy. Doing well overall. Works with therapy dogs. Due for mammo 4/22. Labs with PCP. No fevers/ chills/ chest pain/ SOB/ nausea/ vomiting/diarrhea/ pain/fatigue        Last visit: Last seen here in 12/19. She will complete 5 years of adjuvant hormonal therapy with Anastrozole in 5/21 - almost done. She reports that she feels well overall today. She is tolerating Anastrozole well overall with no significant side effects. She has occasional joint stiffness but tolerable. Hot flashes have resolved. Her appetite and energy levels are low overall. She had a bilateral mammogram in 6/19 that was negative/good. She did not have a mammogram in 2020. She has routine HM and labs with her PCP. She denies fever, chills, cough, SOB, CP, nausea, vomiting, diarrhea, and constipation. She denies pain today. She continues to work during [employer] in [employer]. She is fully vaccinated for COVID-19. She is here alone today. DX:  Encounter Diagnoses   Name Primary?     Malignant neoplasm of upper-outer quadrant of left breast in female, estrogen receptor positive ([address] Yes    History of lumpectomy of left breast       Past Medical History:   Diagnosis Date    Breast cancer ([address] 12/05/2015    IDC, DCIS    Cancer ([address]     LEFT BREAST    Fibrocystic breast disease     Psychiatric disorder 12/29/15    DEPRESSION    PUD (peptic ulcer disease)     OCCASIONAL DISCOMFORT-OCC TAKES ZANTAC    S/P radiation therapy 2016    left    Sleep apnea     USES CPAP     Past Surgical History:   Procedure Laterality Date    HX APPENDECTOMY  1973    HX BREAST BIOPSY Left     22 yo - benign    HX BREAST LUMPECTOMY Left 2016    LEFT BREAST LUMPECTOMY WITH SENTINEL NODE BIOPSY WITH ULTRASOUND performed by Rosario [facility]alera [facility]    HX COLONOSCOPY  X2    LATEST-    HX WISDOM TEETH EXTRACTION  1983    NC BREAST SURGERY PROCEDURE UNLISTED      BREAST BIOPSY-CYST-LEFT     Social History     Socioeconomic History    Marital status:    Tobacco Use    Smoking status: Former Smoker     Packs/day: 0.25     Years: 30.00     Pack years: 7.50     Quit date: 2014     Years since quittin.1    Smokeless tobacco: Never Used    Tobacco comment: HAS QUIT NUMEROUS TIMES   Substance and Sexual Activity    Alcohol use: Yes     Alcohol/week: 4.0 standard drinks     Types: 2 Glasses of wine, 2 Standard drinks or equivalent per week    Drug use: No     Comment: BEFORE AGE 23, USED HASH       Family History   Problem Relation Age of Onset    Cancer Mother         breast cancer & pancreatic cancer    Breast Cancer Mother 54    Heart Disease Father     Cancer Maternal Grandmother         breast cancer    Breast Cancer Maternal Grandmother     Other Brother         leukemia     Other Brother         hepatitis C    Diabetes Sister     Diabetes Brother     Anesth Problems Neg Hx      Current Outpatient Medications   Medication Sig Dispense Refill    venlafaxine-SR (EFFEXOR-XR) 150 mg capsule       ascorbic acid, vitamin C, (VITAMIN C) 1,000 mg tablet Take  by mouth.  DOCUSATE CALCIUM (STOOL SOFTENER PO) Take  by mouth.  aspirin delayed-release 81 mg tablet Take  by mouth daily.  cholecalciferol (VITAMIN D3) 1,000 unit tablet Take 2,000 Units by mouth daily.  CALCIUM CARBONATE (CALCIUM 600 PO) Take 1,200 mg by mouth daily.       multivitamin (ONE A DAY) tablet Take 1 Tab by mouth daily.  VITAMIN E ACETATE (VITAMIN E PO) Take 1,000 Units by mouth daily.  anastrozole (ARIMIDEX) 1 mg tablet TAKE 1 TABLET DAILY FOR    HORMONE RECEPTOR POSITIVE  BREAST CANCER (Patient not taking: Reported on 3/9/2022) 90 Tab 0     Allergies   Allergen Reactions    Pcn [Penicillins] Other (comments)     \"convulsions\"    Sulfa (Sulfonamide Antibiotics) Hives     Review of Systems  A complete review of systems was obtained, negative except as described above and as reported on ROS sheet scanned into system. Objective:  Visit Vitals  /68 (BP 1 Location: Left upper arm, BP Patient Position: Sitting)   Pulse 86   Temp 96.9 °F (36.1 °C) (Temporal)   Ht 5' 8\" (1.727 m)   Wt 246 lb 9.6 oz (111.9 kg)   SpO2 95%   BMI 37.50 kg/m²     Physical Exam:   General appearance - Alert, well appearing, and in no distress, oriented to person, place, and time and overweight  Mental status - Alert, oriented to person, place, and time, normal mood, behavior, speech, dress, motor activity, and thought processes  EYE - Conj clear  Mouth - Wearing a mask   Neck - Supple  Chest - Clear to auscultation, normal respiratory effort  Heart - Normal rate and regular rhythm   Abdomen - Soft, nontender  Ext - No pedal edema noted  Skin - Warm and dry. Neuro - Alert, oriented, normal speech, no focal findings or movement disorder noted  Breast - No masses palpable in bilateral breasts     Diagnostic Imaging Reviewed:    [facility] Results (most recent):  Results from Hospital Encounter encounter on 04/07/21    [facility] 3D ARTURO W MAMMO BI DX INCL CAD    Narrative  STUDY:  Bilateral Diagnostic Digital Mammography including 3-D Tomosynthesis    INDICATION:  Left lumpectomy for carcinoma in 2016    COMPARISON:  Prior studies dating back to 2013    BREAST COMPOSITION: The breasts are heterogeneously dense, which may obscure  small masses.     FINDINGS: Bilateral digital diagnostic mammography was performed, and is  interpreted in conjunction with a computer assisted detection (CAD) system. In  addition to standard 2-D bilateral CC and MLO views, bilateral 3-D/tomosynthesis  was performed in CC and MLO projections. Additionally, magnification views were  performed of the left breast.    Lumpectomy changes are noted in the left breast. Magnification views of the  lumpectomy bed in the upper outer quadrant of the left breast demonstrate no  suspicious microcalcifications or other findings. No new masses or suspicious  calcifications are identified within either breast.    Impression  1. BI-RADS Assessment Category 2: Benign finding. Lumpectomy changes in the left  breast are stable. No evidence of recurrent malignancy. 2. No mammographic evidence of malignancy in either breast.  3. No significant change. Recommend annual screening mammography. The patient has been notified of these results and recommendations. Lab Results Reviewed:  Lab Results   Component Value Date/Time    WBC 7.4 12/29/2015 04:04 PM    HGB 12.9 12/29/2015 04:04 PM    HCT 37.5 12/29/2015 04:04 PM    PLATELET 955 47/72/6105 04:04 PM    MCV 92.4 12/29/2015 04:04 PM       Lab Results   Component Value Date/Time    Sodium 140 12/29/2015 04:04 PM    Potassium 4.2 12/29/2015 04:04 PM    Chloride 104 12/29/2015 04:04 PM    CO2 29 12/29/2015 04:04 PM    Anion gap 7 12/29/2015 04:04 PM    Glucose 84 12/29/2015 04:04 PM    BUN 14 12/29/2015 04:04 PM    Creatinine 0.82 12/29/2015 04:04 PM    BUN/Creatinine ratio 17 12/29/2015 04:04 PM    GFR est AA >60 12/29/2015 04:04 PM    GFR est non-AA >60 12/29/2015 04:04 PM    Calcium 9.2 12/29/2015 04:04 PM     Assessment/Plan:     1. T2N0 ER/MD+ Her 2 nadine negative Left Breast Cancer with hx of Lumpectomy and Radiation  Mammaprint low risk luminal A. She is here today for office follow up. Feels well overall. completed 5 years of adjuvant hormonal therapy. bilateral mammogram 5/21.    Yearly bilateral mammogram ordered today. She has routine HM and labs with her PCP. Follow up in 12 months. 2.  Family Hx of Breast Cancer/Pancreatic Cancer in mom  BRCA inconclusive     3. Sleep Apnea/Depression/Obesity  On Effexor. Management per PCP. 4.  Osteopenia  She is taking Calcium and Vitamin D.     5.  Psychosocial  Mood good, coping well. 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 Has gained weight and trying to lose weight. She is here alone today. Call if questions.    Follow up here in 12 months    Signed: Karolyn Khoury, DO

## 2022-03-09 NOTE — PROGRESS NOTES
Olivia Rasheed is a 59 y.o. female  Chief Complaint   Patient presents with    Follow-up    Breast Cancer     1. Have you been to the ER, urgent care clinic since your last visit? Hospitalized since your last visit? No.    2. Have you seen or consulted any other health care providers outside of the [address] [place] [address] since your last visit? Include any pap smears or colon screening. No.

## 2022-03-18 PROBLEM — E66.01 SEVERE OBESITY (HCC): Status: ACTIVE | Noted: 2019-06-07

## 2022-03-18 PROBLEM — Z85.3 ENCOUNTER FOR FOLLOW-UP SURVEILLANCE OF BREAST CANCER: Status: ACTIVE | Noted: 2021-03-09

## 2022-03-18 PROBLEM — Z08 ENCOUNTER FOR FOLLOW-UP SURVEILLANCE OF BREAST CANCER: Status: ACTIVE | Noted: 2021-03-09

## 2022-03-19 PROBLEM — R23.2 HOT FLASHES: Status: ACTIVE | Noted: 2019-06-07

## 2022-03-19 PROBLEM — M85.80 OSTEOPENIA: Status: ACTIVE | Noted: 2019-06-07

## 2022-03-19 PROBLEM — Z91.89 AT RISK FOR OSTEOPOROSIS: Status: ACTIVE | Noted: 2019-06-07

## 2022-03-20 PROBLEM — M25.60 JOINT STIFFNESS: Status: ACTIVE | Noted: 2019-06-07

## 2022-03-20 PROBLEM — Z85.3 HISTORY OF LEFT BREAST CANCER: Status: ACTIVE | Noted: 2021-03-09

## 2022-04-01 ENCOUNTER — HOSPITAL ENCOUNTER (OUTPATIENT)
Dept: MAMMOGRAPHY | Age: 62
Discharge: HOME | End: 2022-04-01
Attending: INTERNAL MEDICINE
Payer: COMMERCIAL

## 2022-04-01 DIAGNOSIS — Z17.0 MALIGNANT NEOPLASM OF UPPER-OUTER QUADRANT OF LEFT BREAST IN FEMALE, ESTROGEN RECEPTOR POSITIVE (HCC): ICD-10-CM

## 2022-04-01 DIAGNOSIS — C50.412 MALIGNANT NEOPLASM OF UPPER-OUTER QUADRANT OF LEFT BREAST IN FEMALE, ESTROGEN RECEPTOR POSITIVE (HCC): ICD-10-CM

## 2022-04-01 DIAGNOSIS — Z12.31 ENCOUNTER FOR SCREENING MAMMOGRAM FOR BREAST CANCER: ICD-10-CM

## 2022-04-01 DIAGNOSIS — Z98.890 HISTORY OF LUMPECTOMY OF LEFT BREAST: ICD-10-CM

## 2022-04-01 PROCEDURE — 77063 BREAST TOMOSYNTHESIS BI: CPT

## 2023-04-04 ENCOUNTER — TELEPHONE (OUTPATIENT)
Dept: ONCOLOGY | Age: 63
End: 2023-04-04

## 2023-04-04 NOTE — TELEPHONE ENCOUNTER
Called patient as courtesy reminder of appointment tomorrow left VM with C/B # if changes need to be made.

## 2023-04-05 ENCOUNTER — OFFICE VISIT (OUTPATIENT)
Dept: ONCOLOGY | Age: 63
End: 2023-04-05
Payer: COMMERCIAL

## 2023-04-05 PROBLEM — Z80.9 FAMILY HISTORY OF CANCER: Status: ACTIVE | Noted: 2023-04-05

## 2023-04-05 PROCEDURE — 99213 OFFICE O/P EST LOW 20 MIN: CPT | Performed by: INTERNAL MEDICINE

## 2023-04-05 NOTE — LETTER
4/5/2023    Patient: Job Joy   YOB: 1960   Date of Visit: 4/5/2023     Maryjane Lacy MD  [address] [address]  Via Fax: [phone]    Dear Maryjane Lacy MD,      Thank you for referring Ms. Lukasz Llanos to [address] [place] [address] for evaluation. My notes for this consultation are attached. If you have questions, please do not hesitate to call me. I look forward to following your patient along with you.       Sincerely,    Cathy Thompson, DO

## 2023-04-05 NOTE — PROGRESS NOTES
Verified Name and  of the patient. Chief Complaint   Patient presents with    Follow-up     1 year follow-up       Health maintenance will be addressed with Primary Care Provider at next visit. Vitals:    23 0813   BP: 127/82   Pulse: 74   Resp: 18   Temp: 98.2 °F (36.8 °C)   TempSrc: Temporal   SpO2: 97%   Weight: 248 lb (112.5 kg)   Height: 5' 7\" (1.702 m)   PainSc:   0 - No pain       1. Have you been to the ER, urgent care clinic since your last visit? Hospitalized since your last visit? No    2. Have you seen or consulted any other health care providers outside of the [address] [place] [address] since your last visit? Include any pap smears or colon screening.  No

## 2023-04-05 NOTE — PROGRESS NOTES
[facility] [place] [facility] [address], [id number] [place] [address] Road, [facility]: [phone]  F: [address] Alfonso Al John Rodas is a 57 y.o. 1960 female and presents with Follow-up (1 year follow-up)    CC: Breast Cancer Dx in 12/15    STAGE: T2N0 ER+ HER2 neg mammoprint low risk    TREATMENT COURSE: Lumpectomy, radiation, and then adjuvant hormonal therapy with Anastrozole x 5 years (finished in 5/21)    HPI:  Tray Watts is a 57 y.o. female seen today in office for 1 year follow up of breast cancer. She completed 5 years of adjuvant hormonal therapy with Anastrozole in 5/21. he reports that she feels well overall today. Her appetite and energy levels are \"normal.\" She denies fever, chills, cough, SOB, CP, nausea, vomiting, diarrhea, and constipation. She denies pain today. She had a bilateral mammogram on 4/1/22 that was negative/good. She has routine HM and labs with her PCP. She works in a longterm. She is here alone today. DX:  Encounter Diagnoses   Name Primary?     Malignant neoplasm of upper-outer quadrant of left breast in female, estrogen receptor positive ([facility] [address] Yes    History of lumpectomy of left breast     Family history of cancer     Depression, unspecified depression type     Sleep apnea, unspecified type     Postmenopausal     Osteopenia, unspecified location     Breast cancer screening by mammogram         Past Medical History:   Diagnosis Date    Breast cancer ([address] 12/05/2015    IDC, DCIS    Cancer ([facility] [address]     LEFT BREAST    Fibrocystic breast disease     Psychiatric disorder 12/29/15    DEPRESSION    PUD (peptic ulcer disease)     OCCASIONAL DISCOMFORT-OCC TAKES ZANTAC    S/P radiation therapy 2016    left    Sleep apnea     USES CPAP     Past Surgical History:   Procedure Laterality Date    HX APPENDECTOMY  1973    HX BREAST BIOPSY Left 1977    22 yo - benign    HX BREAST BIOPSY Left 12/08/2015    US guided Positive for BR CA    HX BREAST LUMPECTOMY Left 2016    LEFT BREAST LUMPECTOMY WITH SENTINEL NODE BIOPSY WITH ULTRASOUND performed by Jessica Caballero MD at [facility]    HX COLONOSCOPY  X2    LATEST-    HX [address] EXTRACTION      TX UNLISTED PROCEDURE BREAST  1981    BREAST BIOPSY-CYST-LEFT     Social History     Socioeconomic History    Marital status:    Tobacco Use    Smoking status: Former     Packs/day: 0.25     Years: 30.00     Pack years: 7.50     Types: Cigarettes     Quit date: 2014     Years since quittin.2    Smokeless tobacco: Never    Tobacco comments:     HAS QUIT NUMEROUS TIMES   Vaping Use    Vaping Use: Never used   Substance and Sexual Activity    Alcohol use: Yes     Alcohol/week: 4.0 standard drinks     Types: 2 Glasses of wine, 2 Standard drinks or equivalent per week    Drug use: No     Comment: BEFORE AGE 16, USED HASH      Sexual activity: Not Currently     Family History   Problem Relation Age of Onset    Cancer Mother         breast cancer & pancreatic cancer    Breast Cancer Mother 54    Heart Disease Father     Cancer Maternal Grandmother         breast cancer    Breast Cancer Maternal Grandmother     Other Brother         leukemia     Other Brother         hepatitis C    Diabetes Sister     Diabetes Brother     Anesth Problems Neg Hx      Current Outpatient Medications   Medication Sig Dispense Refill    venlafaxine-SR (EFFEXOR-XR) 150 mg capsule       ascorbic acid, vitamin C, (VITAMIN C) 1,000 mg tablet Take  by mouth. aspirin delayed-release 81 mg tablet Take  by mouth daily. cholecalciferol (VITAMIN D3) 1,000 unit tablet Take 1 Tablet by mouth daily. CALCIUM CARBONATE (CALCIUM 600 PO) Take 1,200 mg by mouth daily. multivitamin (ONE A DAY) tablet Take 1 Tablet by mouth daily. VITAMIN E ACETATE (VITAMIN E PO) Take 1,000 Units by mouth daily.        Allergies   Allergen Reactions    Pcn [Penicillins] Other (comments)     \"convulsions\"    Sulfa (Sulfonamide Antibiotics) Hives     Review of Systems  A complete review of systems was obtained, negative except as described above and as reported on ROS sheet scanned into system. Objective:  Visit Vitals  /82 (BP 1 Location: Left arm, BP Patient Position: Sitting, BP Cuff Size: Adult)   Pulse 74   Temp 98.2 °F (36.8 °C) (Temporal)   Resp 18   Ht 5' 7\" (1.702 m)   Wt 248 lb (112.5 kg)   SpO2 97%   BMI 38.84 kg/m²     Physical Exam:   ECO  General appearance - Alert, well appearing, and in no distress, oriented to person, place, and time and overweight  Mental status - Alert, oriented to person, place, and time, normal mood, behavior, speech, dress, motor activity, and thought processes  EYE - Conj clear  Mouth - Wearing a mask   Neck - Supple  Chest - Clear to auscultation, normal respiratory effort  Heart - Normal rate and regular rhythm   Abdomen - Soft, nontender, obese   Ext - No pedal edema noted  Skin - Warm and dry. Neuro - Alert, oriented, normal speech, no focal findings or movement disorder noted  Breast - No masses palpable in bilateral breasts     Diagnostic Imaging Reviewed:    DEXA Results (most recent):  Results from Hospital Encounter encounter on 21    DEXA BONE DENSITY STUDY AXIAL    Narrative  Bone Mineral Density    Indication: Osteopenia followup  Age: [age]  Sex: Female. Menopause status: postmenopausal.  Hormone replacement therapy: No    Number of falls in the past year: None. Risk factors for osteoporosis: History of fracture    Current medication for osteoporosis: None.     Comparison:     Technique: Imaging was performed on the [address] [place] [address]  meta-analysis fracture risk calculator (FRAX) analysis was performed for 10 year  fracture risk probability assessment    Excluded sites: None    Findings:      Femoral Neck Left:  Bone mineral density (gm/cm2): 0.827 g/cm?  % of peak bone mass: 80%  % for age matched controls: 87%  T-score: -1.5  Z-score: -0.9    Femoral Neck Right:  Bone mineral density (gm/cm2): 0.802 g/cm?  % of peak bone mass: 77%  % for age matched controls:  84%  T-score: -1.7  Z-score: -1.1    Total Hip Left:  Bone mineral density (gm/cm2): 0.874 g/cm?  % of peak bone mass: 87%  % for age matched controls: 89%  T-score: -1.1  Z-score: -0.8    Total Hip Right:  Bone mineral density (gm/cm2): 0.852 g/cm?  % of peak bone mass: 85%  % for age matched controls:  87%  T-score: -1.2  Z-score: -1.0    Lumbar Spine: L1-L4  Bone mineral density (gm/cm2): 1.038 g/cm?  % of peak bone mass: 87%  % for age matched controls: 89%  T-score: -1.3  Z-score: -1.0    33% Radius Left:  Bone mineral density (gm/cm2): 0.800 g/cm?  % of peak bone mass: 91%  % for age matched controls:  103%  T-score: -0.9  Z-score: 0.3    Impression  This patient is osteopenic using the World Health Organization criteria  As compared to the prior study, there has been a statistically significant  decrease in bone mineral density. 10 year probability of major osteoporotic fracture: 13.9  10 year probability of hip fracture: 1.5    Recommendations:  Therapy recommendations need to be tailored to each individual patient. Using  the [address] [place] [address]) FRAX absolute fracture algorithm, the  [address] [place] [address] recommends beginning pharmacological therapy in  postmenopausal women and men over the age of 48 with a 8 year probability of a  hip fracture of >3% OR with the 10 year probability of a major osteoporotic  fracture of >20%. Please reconsider testing based on risk factors. Currently, Medicare will only  reimburse for a central DXA examination every two years, unless the patient is  on chronic glucocorticoid therapy. Note: Please note that reliable, valid comparisons cannot be made between  studies which have been performed on machines from different manufacturers.  If  clinically warranted, a follow up study performed at this site, on the same  unit, would allow the most sensitive assessment of change in bone mineral  density. [facility] Results (most recent):  Results from Hospital Encounter encounter on 04/01/22    [facility] 3D ARTURO W MAMMO BI SCREENING INCL CAD    Narrative  STUDY: Bilateral digital screening mammogram with 3-D tomosynthesis    INDICATION:  Screening. COMPARISON: Prior studies dating back to 2005    BREAST COMPOSITION: The breasts are heterogeneously dense, which may obscure  small masses. FINDINGS: Bilateral digital screening mammography was performed and is  interpreted in conjunction with a computer assisted detection (CAD) system. Additionally, tomosynthesis of both breasts in the CC and MLO projections was  performed. Surgical changes in the upper outer posterior left breast are stable. No suspicious masses or calcifications are identified. There has been no  significant change. Impression  BI-RADS 2: Benign. No mammographic evidence of malignancy. Stable left breast  posttreatment changes. RECOMMENDATIONS:  Next screening mammogram is recommended in one year. The patient will be notified of these results. Lab Results Reviewed:  Lab Results   Component Value Date/Time    WBC 7.4 12/29/2015 04:04 PM    HGB 12.9 12/29/2015 04:04 PM    HCT 37.5 12/29/2015 04:04 PM    PLATELET 851 64/97/5647 04:04 PM    MCV 92.4 12/29/2015 04:04 PM       Lab Results   Component Value Date/Time    Sodium 140 12/29/2015 04:04 PM    Potassium 4.2 12/29/2015 04:04 PM    Chloride 104 12/29/2015 04:04 PM    CO2 29 12/29/2015 04:04 PM    Anion gap 7 12/29/2015 04:04 PM    Glucose 84 12/29/2015 04:04 PM    BUN 14 12/29/2015 04:04 PM    Creatinine 0.82 12/29/2015 04:04 PM    BUN/Creatinine ratio 17 12/29/2015 04:04 PM    GFR est AA >60 12/29/2015 04:04 PM    GFR est non-AA >60 12/29/2015 04:04 PM    Calcium 9.2 12/29/2015 04:04 PM     Assessment/Plan:     1.   T2N0 ER/FL+ Her 2 nadine negative Left Breast Cancer with hx of Lumpectomy and Radiation  Mammaprint low risk luminal A. She completed 5 years of adjuvant hormonal therapy in 5/21. Patient seen today in office for yearly follow up. She is not on any therapy from here. She is clinically stable today and doing well overall. She had a bilateral mammogram on 4/1/22 that was negative/good. Yearly bilateral mammogram ordered today. She has routine HM and labs with her PCP. Continue course of surveillance. Follow up in 12 months. Patient agrees with plan. 2.  Family Hx of Breast Cancer/Pancreatic Cancer in Mom  BRCA inconclusive     3. Sleep Apnea/Depression/Obesity  On Effexor. Management per PCP. 4.  Osteopenia  She is taking Calcium and Vitamin D. Will order follow up DEXA at next visit. 5.  Psychosocial  Mood good, coping well. 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 She is here alone today. Call if questions. Follow up here in 12 months    I have personally performed a face to face diagnostic evaluation on this patient. I have reviewed and agree with care plan today. My findings are as follows:  Breast cancer done adjuvant hormonal therapy  Doing well overall/ no new breast issues.    Mammo up to date/ due this month  Labs and routine HM with PCP  Continue course of surveillance  Pt is still working with her therapy dog    Signed: Mickey Louis, DO

## 2023-04-22 ENCOUNTER — TRANSCRIBE ORDERS (OUTPATIENT)
Facility: HOSPITAL | Age: 63
End: 2023-04-22

## 2023-04-22 DIAGNOSIS — Z12.31 SCREENING MAMMOGRAM FOR HIGH-RISK PATIENT: Primary | ICD-10-CM

## 2023-06-15 ENCOUNTER — HOSPITAL ENCOUNTER (OUTPATIENT)
Facility: HOSPITAL | Age: 63
Discharge: HOME | End: 2023-06-18
Payer: COMMERCIAL

## 2023-06-15 DIAGNOSIS — Z12.31 SCREENING MAMMOGRAM FOR HIGH-RISK PATIENT: ICD-10-CM

## 2023-06-15 PROCEDURE — 77063 BREAST TOMOSYNTHESIS BI: CPT

## 2024-06-09 NOTE — PROGRESS NOTES
Cancer [place] at [facility]  [address], [address] [place] [address]  W: [phone]  F: [phone]    Reason for Visit:   Benita Denis is a 63 y.o. female seen today in office for follow up of Left Breast Cancer.     Treatment History:   Left Breast Cancer Dx in 12/15  Lumpectomy, radiation, and then adjuvant hormonal therapy with Anastrozole x 5 years (finished in 5/21)    STAGE: T2N0 ER+ HER2 neg MammaPrint low risk    History of Present Illness:   Benita Denis is a 63 y.o. female seen today in office for 14 month follow up of ER+ Her2 Negative Left Breast Cancer. She completed 5 years of adjuvant hormonal therapy with Anastrozole in 5/21. She reports that she feels well overall today. Her appetite and energy levels are stable/good. She denies fever, chills, cough, SOB, CP, nausea, vomiting, diarrhea, and constipation. She denies pain today. She is here alone today.       Past Medical History:   Diagnosis Date    Breast cancer (HCC) 12/05/2015    IDC, DCIS    Cancer (HCC)     LEFT BREAST    Fibrocystic breast disease     Obesity     Psychiatric disorder 12/29/15    DEPRESSION    PUD (peptic ulcer disease)     OCCASIONAL DISCOMFORT-OCC TAKES ZANTAC    S/P radiation therapy 2016    left    Sleep apnea     USES CPAP      Past Surgical History:   Procedure Laterality Date    APPENDECTOMY  1973    BREAST BIOPSY Left 12/08/2015    US guided Positive for BR CA    BREAST BIOPSY Left 1977    16 yo - benign    BREAST LUMPECTOMY Left 1/5/2016    LEFT BREAST LUMPECTOMY WITH SENTINEL NODE BIOPSY WITH ULTRASOUND performed by Marissa Santiago MD at [facility]    BREAST SURGERY  1981    BREAST BIOPSY-CYST-LEFT    COLONOSCOPY  X2    LATEST-2015    WISDOM TOOTH EXTRACTION  1983      Social History     Tobacco Use    Smoking status: Former     Current packs/day: 0.00     Average packs/day: 1 pack/day for 15.0 years (15.0 ttl pk-yrs)     Types: Cigarettes     Quit date: 12/29/2014

## 2024-06-10 ENCOUNTER — OFFICE VISIT (OUTPATIENT)
Age: 64
End: 2024-06-10
Payer: COMMERCIAL

## 2024-06-10 VITALS
OXYGEN SATURATION: 96 % | DIASTOLIC BLOOD PRESSURE: 77 MMHG | WEIGHT: 244 LBS | BODY MASS INDEX: 38.22 KG/M2 | RESPIRATION RATE: 18 BRPM | SYSTOLIC BLOOD PRESSURE: 128 MMHG | TEMPERATURE: 98.3 F | HEART RATE: 86 BPM

## 2024-06-10 DIAGNOSIS — Z12.31 BREAST CANCER SCREENING BY MAMMOGRAM: ICD-10-CM

## 2024-06-10 DIAGNOSIS — Z98.890 HISTORY OF LUMPECTOMY OF LEFT BREAST: ICD-10-CM

## 2024-06-10 DIAGNOSIS — F32.A DEPRESSION, UNSPECIFIED DEPRESSION TYPE: ICD-10-CM

## 2024-06-10 DIAGNOSIS — Z80.9 FAMILY HISTORY OF CANCER: ICD-10-CM

## 2024-06-10 DIAGNOSIS — M85.80 OSTEOPENIA, UNSPECIFIED LOCATION: ICD-10-CM

## 2024-06-10 DIAGNOSIS — Z78.0 POSTMENOPAUSAL: ICD-10-CM

## 2024-06-10 DIAGNOSIS — G47.30 SLEEP APNEA, UNSPECIFIED TYPE: ICD-10-CM

## 2024-06-10 DIAGNOSIS — Z85.3 HISTORY OF LEFT BREAST CANCER: Primary | ICD-10-CM

## 2024-06-10 PROCEDURE — 1123F ACP DISCUSS/DSCN MKR DOCD: CPT | Performed by: NURSE PRACTITIONER

## 2024-06-10 PROCEDURE — 99213 OFFICE O/P EST LOW 20 MIN: CPT | Performed by: NURSE PRACTITIONER

## 2024-06-10 RX ORDER — MULTIVIT WITH MINERALS/LUTEIN
1000 TABLET ORAL DAILY
COMMUNITY

## 2024-06-10 RX ORDER — UBIDECARENONE 75 MG
50 CAPSULE ORAL DAILY
COMMUNITY

## 2024-06-10 NOTE — PROGRESS NOTES
Benita Denis is a 63 y.o. female    Chief Complaint   Patient presents with    Follow-up     Left Breast Cancer       1. Have you been to the ER, urgent care clinic since your last visit?  Hospitalized since your last visit? Yes, [facility] last spring/summer x 1 day pt does not recall which date    2. Have you seen or consulted any other health care providers outside of the [facility] since your last visit?  Include any pap smears or colon screening. Yes, Dr. Refugio Arias's NP/[facility] on [facility]

## 2024-11-01 ENCOUNTER — HOSPITAL ENCOUNTER (OUTPATIENT)
Facility: HOSPITAL | Age: 64
Discharge: HOME | End: 2024-11-04
Payer: COMMERCIAL

## 2024-11-01 VITALS — BODY MASS INDEX: 37.67 KG/M2 | HEIGHT: 67 IN | WEIGHT: 240 LBS

## 2024-11-01 VITALS — WEIGHT: 244 LBS | BODY MASS INDEX: 38.3 KG/M2 | HEIGHT: 67 IN

## 2024-11-01 DIAGNOSIS — M85.80 OSTEOPENIA, UNSPECIFIED LOCATION: ICD-10-CM

## 2024-11-01 DIAGNOSIS — Z78.0 POSTMENOPAUSAL: ICD-10-CM

## 2024-11-01 DIAGNOSIS — Z85.3 HISTORY OF LEFT BREAST CANCER: ICD-10-CM

## 2024-11-01 DIAGNOSIS — Z12.31 BREAST CANCER SCREENING BY MAMMOGRAM: ICD-10-CM

## 2024-11-01 PROCEDURE — 77063 BREAST TOMOSYNTHESIS BI: CPT

## 2024-11-01 PROCEDURE — 77080 DXA BONE DENSITY AXIAL: CPT

## 2024-11-08 ENCOUNTER — TELEPHONE (OUTPATIENT)
Age: 64
End: 2024-11-08

## 2025-06-10 ENCOUNTER — OFFICE VISIT (OUTPATIENT)
Age: 65
End: 2025-06-10

## 2025-06-10 VITALS
OXYGEN SATURATION: 94 % | RESPIRATION RATE: 18 BRPM | SYSTOLIC BLOOD PRESSURE: 137 MMHG | HEART RATE: 75 BPM | WEIGHT: 247 LBS | BODY MASS INDEX: 38.69 KG/M2 | TEMPERATURE: 98.4 F | DIASTOLIC BLOOD PRESSURE: 80 MMHG

## 2025-06-10 DIAGNOSIS — Z98.890 HISTORY OF LUMPECTOMY OF LEFT BREAST: ICD-10-CM

## 2025-06-10 DIAGNOSIS — Z85.3 HISTORY OF LEFT BREAST CANCER: Primary | ICD-10-CM

## 2025-06-10 RX ORDER — MULTIVITAMIN
1 TABLET ORAL DAILY
COMMUNITY

## 2025-06-10 NOTE — PROGRESS NOTES
Benita Denis is a 64 y.o. female    Chief Complaint   Patient presents with    Follow-up     Left Breast Cancer       1. Have you been to the ER, urgent care clinic since your last visit?  Hospitalized since your last visit? Yes, [facility] for chest pain 7/24.    2. Have you seen or consulted any other health care providers outside of the [facility] since your last visit?  Include any pap smears or colon screening. No    
  adenosis, columnar change and microcalcification     DEXA Result (most recent):  DEXA BONE DENSITY AXIAL SKELETON 11/01/2024    Narrative  Bone Mineral Density    Indication: Personal history of malignant neoplasm of breast; Other specified  disorders of bone density and structure, unspecified site; Asymptomatic  menopausal state.  Age: 64.  Sex: Female.    Menopause status: Postmenopausal..  Hormone replacement therapy: No.    Number of falls in the past year: None..  Risk factors for osteoporosis: None..    Current medication for osteoporosis: None.    Comparison: 7/12/2021    Technique: Imaging was performed on the [employer]  World Health Organization  meta-analysis fracture risk calculator (FRAX) analysis was performed for 10 year  fracture risk probability assessment    Excluded sites: None    Findings:      Femoral Neck Left: .  Bone mineral density (gm/cm2): 0.809 g/cm?.  % of peak bone mass: 78.  % for age matched controls: 87.  T-score: -1.6 .  Z-score: -0.9 .    Femoral Neck Right: .  Bone mineral density (gm/cm2): 0.803 g/cm?.  % of peak bone mass: 77.  % for age matched controls: 86.  T-score: -1.7 .  Z-score: -0.9 .    Total Hip Left: .  Bone mineral density (gm/cm2): 0.899 g/cm?.  % of peak bone mass: 89.  % for age matched controls: 94.  T-score: -0.9 .  Z-score: -0.4 .    Total Hip Right: .  Bone mineral density (gm/cm2): 0.876 g/cm?.  % of peak bone mass: 87.  % for age matched controls: 92.  T-score: -1.0 .  Z-score: -0.6 .    Lumbar Spine: L-1 to L4.  Bone mineral density (gm/cm2): 1.098 g/cm?.  % of peak bone mass: 92.  % for age matched controls: 96.  T-score: -0.8 .  Z-score: -0.3 .    33% Radius Left: .  Bone mineral density (gm/cm2): 0.788 g/cm?.  % of peak bone mass: 90.  % for age matched controls: 105.  T-score: -1.0 .  Z-score: 0.5 .    Impression  This patient is osteopenic using the World Health Organization criteria  As compared to the prior study, there has been a decrease in the